# Patient Record
Sex: FEMALE | Race: WHITE | NOT HISPANIC OR LATINO | Employment: FULL TIME | URBAN - METROPOLITAN AREA
[De-identification: names, ages, dates, MRNs, and addresses within clinical notes are randomized per-mention and may not be internally consistent; named-entity substitution may affect disease eponyms.]

---

## 2017-03-15 ENCOUNTER — APPOINTMENT (OUTPATIENT)
Dept: GENERAL RADIOLOGY | Facility: HOSPITAL | Age: 47
End: 2017-03-15

## 2017-03-15 LAB
ALBUMIN SERPL-MCNC: 4.1 G/DL (ref 3.5–5.2)
ALBUMIN/GLOB SERPL: 1.1 G/DL
ALP SERPL-CCNC: 73 U/L (ref 39–117)
ALT SERPL W P-5'-P-CCNC: 14 U/L (ref 1–33)
ANION GAP SERPL CALCULATED.3IONS-SCNC: 11.7 MMOL/L
AST SERPL-CCNC: 16 U/L (ref 1–32)
BASOPHILS # BLD AUTO: 0.02 10*3/MM3 (ref 0–0.2)
BASOPHILS NFR BLD AUTO: 0.2 % (ref 0–1.5)
BILIRUB SERPL-MCNC: <0.2 MG/DL (ref 0.1–1.2)
BUN BLD-MCNC: 16 MG/DL (ref 6–20)
BUN/CREAT SERPL: 19.8 (ref 7–25)
CALCIUM SPEC-SCNC: 9.6 MG/DL (ref 8.6–10.5)
CHLORIDE SERPL-SCNC: 103 MMOL/L (ref 98–107)
CO2 SERPL-SCNC: 27.3 MMOL/L (ref 22–29)
CREAT BLD-MCNC: 0.81 MG/DL (ref 0.57–1)
DEPRECATED RDW RBC AUTO: 39.3 FL (ref 37–54)
EOSINOPHIL # BLD AUTO: 0.19 10*3/MM3 (ref 0–0.7)
EOSINOPHIL NFR BLD AUTO: 2.1 % (ref 0.3–6.2)
ERYTHROCYTE [DISTWIDTH] IN BLOOD BY AUTOMATED COUNT: 12.9 % (ref 11.7–13)
GFR SERPL CREATININE-BSD FRML MDRD: 75 ML/MIN/1.73
GLOBULIN UR ELPH-MCNC: 3.7 GM/DL
GLUCOSE BLD-MCNC: 117 MG/DL (ref 65–99)
HCT VFR BLD AUTO: 35.7 % (ref 35.6–45.5)
HGB BLD-MCNC: 11.7 G/DL (ref 11.9–15.5)
IMM GRANULOCYTES # BLD: 0 10*3/MM3 (ref 0–0.03)
IMM GRANULOCYTES NFR BLD: 0 % (ref 0–0.5)
LYMPHOCYTES # BLD AUTO: 3.57 10*3/MM3 (ref 0.9–4.8)
LYMPHOCYTES NFR BLD AUTO: 40.3 % (ref 19.6–45.3)
MCH RBC QN AUTO: 27.7 PG (ref 26.9–32)
MCHC RBC AUTO-ENTMCNC: 32.8 G/DL (ref 32.4–36.3)
MCV RBC AUTO: 84.4 FL (ref 80.5–98.2)
MONOCYTES # BLD AUTO: 0.46 10*3/MM3 (ref 0.2–1.2)
MONOCYTES NFR BLD AUTO: 5.2 % (ref 5–12)
NEUTROPHILS # BLD AUTO: 4.61 10*3/MM3 (ref 1.9–8.1)
NEUTROPHILS NFR BLD AUTO: 52.2 % (ref 42.7–76)
PLATELET # BLD AUTO: 310 10*3/MM3 (ref 140–500)
PMV BLD AUTO: 10.3 FL (ref 6–12)
POTASSIUM BLD-SCNC: 4.2 MMOL/L (ref 3.5–5.2)
PROT SERPL-MCNC: 7.8 G/DL (ref 6–8.5)
RBC # BLD AUTO: 4.23 10*6/MM3 (ref 3.9–5.2)
S PYO AG THROAT QL: NEGATIVE
SODIUM BLD-SCNC: 142 MMOL/L (ref 136–145)
WBC NRBC COR # BLD: 8.85 10*3/MM3 (ref 4.5–10.7)

## 2017-03-15 PROCEDURE — 99284 EMERGENCY DEPT VISIT MOD MDM: CPT

## 2017-03-15 PROCEDURE — 71020 HC CHEST PA AND LATERAL: CPT

## 2017-03-15 PROCEDURE — 85025 COMPLETE CBC W/AUTO DIFF WBC: CPT | Performed by: EMERGENCY MEDICINE

## 2017-03-15 PROCEDURE — 99283 EMERGENCY DEPT VISIT LOW MDM: CPT

## 2017-03-15 PROCEDURE — 80053 COMPREHEN METABOLIC PANEL: CPT | Performed by: EMERGENCY MEDICINE

## 2017-03-15 PROCEDURE — 87081 CULTURE SCREEN ONLY: CPT | Performed by: EMERGENCY MEDICINE

## 2017-03-15 PROCEDURE — 87880 STREP A ASSAY W/OPTIC: CPT | Performed by: EMERGENCY MEDICINE

## 2017-03-15 RX ORDER — ACETAMINOPHEN 325 MG/1
650 TABLET ORAL EVERY 6 HOURS PRN
COMMUNITY
End: 2022-04-11

## 2017-03-16 ENCOUNTER — HOSPITAL ENCOUNTER (EMERGENCY)
Facility: HOSPITAL | Age: 47
Discharge: HOME | End: 2017-03-16
Attending: EMERGENCY MEDICINE | Admitting: EMERGENCY MEDICINE

## 2017-03-16 VITALS
TEMPERATURE: 97.6 F | RESPIRATION RATE: 16 BRPM | HEIGHT: 63 IN | SYSTOLIC BLOOD PRESSURE: 123 MMHG | BODY MASS INDEX: 28.35 KG/M2 | HEART RATE: 80 BPM | DIASTOLIC BLOOD PRESSURE: 76 MMHG | OXYGEN SATURATION: 98 % | WEIGHT: 160 LBS

## 2017-03-16 DIAGNOSIS — J45.40 REACTIVE AIRWAY DISEASE, MODERATE PERSISTENT, UNCOMPLICATED: Primary | ICD-10-CM

## 2017-03-16 PROCEDURE — 94640 AIRWAY INHALATION TREATMENT: CPT

## 2017-03-16 RX ORDER — ALBUTEROL SULFATE 2.5 MG/3ML
2.5 SOLUTION RESPIRATORY (INHALATION) ONCE
Status: COMPLETED | OUTPATIENT
Start: 2017-03-16 | End: 2017-03-16

## 2017-03-16 RX ORDER — ALBUTEROL SULFATE 90 UG/1
2 AEROSOL, METERED RESPIRATORY (INHALATION) EVERY 4 HOURS PRN
Qty: 1 INHALER | Refills: 0 | Status: SHIPPED | OUTPATIENT
Start: 2017-03-16 | End: 2020-10-14

## 2017-03-16 RX ADMIN — ALBUTEROL SULFATE 2.5 MG: 2.5 SOLUTION RESPIRATORY (INHALATION) at 01:46

## 2017-03-16 NOTE — ED PROVIDER NOTES
History   Pt presents to the ED c/o a sore throat that has been present for the past month. Pt also reports a productive cough with yellow sputum, congestion, and SOA. She denies fever, chills, abd pain, N/V/D, or CP.     Exam  Pt is resting comfortably, in no distress, and without focal neurologic deficit  Cardiovascular exam is within normal limits and without murmur  Lungs are clear  Oropharynx is clear and w/o swelling.     Course  Strep is negative. Pt will be discharged.    Attestation:  I supervised care provided by the midlevel provider.    We have discussed this patient's history, physical exam, and treatment plan.    I have reviewed the note and personally saw and examined the patient and agree with the plan of care.  I agree with the midlevel provider's findings and plan. I reviewed the midlevel providers note.     Documentation assistance provided by mallory Chester for Dr Le Information recorded by the mallory was done at my direction and has been verified and validated by me.     Lis Chester  03/16/17 1378       Kenneth Le MD  03/16/17 4317

## 2017-03-16 NOTE — ED NOTES
Pt presents to ER with c/o sore throat that has been going on for a month.  Within the last 2 weeks, she c/o cough with green sputum.  Family member with her states she has been increasingly short of breath the past two weeks.     Leigh Ann Mohan RN  03/15/17 6268

## 2017-03-16 NOTE — ED PROVIDER NOTES
EMERGENCY DEPARTMENT ENCOUNTER    CHIEF COMPLAINT  Chief Complaint: Sore Throat  History given by: Patient with    History limited by:   Room Number: 03/03  PMD: No Known Provider      HPI:  Pt is a 46 y.o. female who presents complaining of sore throat that has been taking place for 1 month. Pt also reports having productive cough with yellow sputum. Pt has also had congestion and SOA. Pt denies any abd pain, vomiting, fever.     Duration: 1 month  Onset: gradual  Timing: waxing and waning  Quality: sore throat with productive cough  Intensity/Severity: moderate  Progression: worsening  Associated Symptoms: congestion, SOA  Aggravating Factors: none  Alleviating Factors: none  Previous Episodes: none  Treatment before arrival: none    PAST MEDICAL HISTORY  Active Ambulatory Problems     Diagnosis Date Noted   • No Active Ambulatory Problems     Resolved Ambulatory Problems     Diagnosis Date Noted   • No Resolved Ambulatory Problems     No Additional Past Medical History       PAST SURGICAL HISTORY  History reviewed. No pertinent past surgical history.    FAMILY HISTORY  History reviewed. No pertinent family history.    SOCIAL HISTORY  Social History     Social History   • Marital status:      Spouse name: N/A   • Number of children: N/A   • Years of education: N/A     Occupational History   • Not on file.     Social History Main Topics   • Smoking status: Never Smoker   • Smokeless tobacco: Not on file   • Alcohol use No   • Drug use: No   • Sexual activity: Defer     Other Topics Concern   • Not on file     Social History Narrative   • No narrative on file       ALLERGIES  Review of patient's allergies indicates no known allergies.    REVIEW OF SYSTEMS  Review of Systems   Constitutional: Negative for fever.   HENT: Positive for congestion and sore throat.    Eyes: Negative.    Respiratory: Positive for cough and shortness of breath.    Cardiovascular: Negative for chest pain.    Gastrointestinal: Negative for abdominal pain, diarrhea and vomiting.   Genitourinary: Negative for dysuria.   Musculoskeletal: Negative for neck pain.   Skin: Negative for rash.   Allergic/Immunologic: Negative.    Neurological: Negative for weakness, numbness and headaches.   Hematological: Negative.    Psychiatric/Behavioral: Negative.    All other systems reviewed and are negative.      PHYSICAL EXAM  ED Triage Vitals   Temp Heart Rate Resp BP SpO2   03/15/17 2125 03/15/17 2125 03/15/17 2125 03/15/17 2127 03/15/17 2125   98.7 °F (37.1 °C) 72 18 151/77 98 %      Temp src Heart Rate Source Patient Position BP Location FiO2 (%)   03/15/17 2125 03/15/17 2125 03/15/17 2127 03/15/17 2127 --   Tympanic Monitor Sitting Left arm        Physical Exam   Constitutional: She is oriented to person, place, and time and well-developed, well-nourished, and in no distress. No distress.   HENT:   Head: Normocephalic and atraumatic.   Mouth/Throat: Oropharynx is clear and moist.   Eyes: EOM are normal. Pupils are equal, round, and reactive to light.   Neck: Normal range of motion. Neck supple.   Cardiovascular: Normal rate, regular rhythm and normal heart sounds.    Pulmonary/Chest: Effort normal and breath sounds normal. No respiratory distress.   Abdominal: Soft. There is no tenderness. There is no rebound and no guarding.   Musculoskeletal: Normal range of motion. She exhibits no edema.   Neurological: She is alert and oriented to person, place, and time. She has normal sensation and normal strength.   Skin: Skin is warm and dry. No rash noted.   Psychiatric: Mood and affect normal.   Nursing note and vitals reviewed.      LAB RESULTS  Lab Results (last 24 hours)     Procedure Component Value Units Date/Time    CBC & Differential [[id number]] Collected:  03/15/17 2207    Specimen:  Blood Updated:  03/15/17 2230    Narrative:       The following orders were created for panel order CBC & Differential.  Procedure                                Abnormality         Status                     ---------                               -----------         ------                     CBC Auto Differential[[id number]]         Abnormal            Final result                 Please view results for these tests on the individual orders.    Comprehensive Metabolic Panel [[id number]]  (Abnormal) Collected:  03/15/17 2207    Specimen:  Blood Updated:  03/15/17 2253     Glucose 117 (H) mg/dL      BUN 16 mg/dL      Creatinine 0.81 mg/dL      Sodium 142 mmol/L      Potassium 4.2 mmol/L      Chloride 103 mmol/L      CO2 27.3 mmol/L      Calcium 9.6 mg/dL      Total Protein 7.8 g/dL      Albumin 4.10 g/dL      ALT (SGPT) 14 U/L      AST (SGOT) 16 U/L      Alkaline Phosphatase 73 U/L      Total Bilirubin <0.2 mg/dL      eGFR Non African Amer 75 mL/min/1.73      Globulin 3.7 gm/dL      A/G Ratio 1.1 g/dL      BUN/Creatinine Ratio 19.8      Anion Gap 11.7 mmol/L     CBC Auto Differential [[id number]]  (Abnormal) Collected:  03/15/17 2207    Specimen:  Blood Updated:  03/15/17 2230     WBC 8.85 10*3/mm3      RBC 4.23 10*6/mm3      Hemoglobin 11.7 (L) g/dL      Hematocrit 35.7 %      MCV 84.4 fL      MCH 27.7 pg      MCHC 32.8 g/dL      RDW 12.9 %      RDW-SD 39.3 fl      MPV 10.3 fL      Platelets 310 10*3/mm3      Neutrophil % 52.2 %      Lymphocyte % 40.3 %      Monocyte % 5.2 %      Eosinophil % 2.1 %      Basophil % 0.2 %      Immature Grans % 0.0 %      Neutrophils, Absolute 4.61 10*3/mm3      Lymphocytes, Absolute 3.57 10*3/mm3      Monocytes, Absolute 0.46 10*3/mm3      Eosinophils, Absolute 0.19 10*3/mm3      Basophils, Absolute 0.02 10*3/mm3      Immature Grans, Absolute 0.00 10*3/mm3     Rapid Strep A Screen [[id number]]  (Normal) Collected:  03/15/17 2208    Specimen:  Swab from Throat Updated:  03/15/17 2244     Strep A Ag Negative     Beta Strep Culture, Throat [[id number]] Collected:  03/15/17 2208    Specimen:  Swab from Throat Updated:  03/15/17 2243          I  ordered the above labs and reviewed the results    RADIOLOGY  XR Chest 2 View   Final Result   Final result by Thomas Mitchell MD (03/15/17 22:55:05)     Narrative:     TWO-VIEW CHEST     HISTORY: Persistent cough and congestion.     The lungs are well-expanded and clear and the heart size is normal. No  acute abnormality is seen.     This report was finalized on 3/15/2017 10:55 PM by Dr. Joni Mitchell MD.             I ordered the above noted radiological studies. Interpreted by radiologist. Reviewed by me in PACS.       PROCEDURES  Procedures      PROGRESS AND CONSULTS  ED Course   12:20 AM  Ordered albuterol breathing tx.  2:19 AM  Rechecked with pt. She is improved after breathing treatment.  Informed pt of her labs and imaging showing nothing remarkable. Discussed with pt about plan to discharge. Pt understands and agrees with plan. All concerns addressed. Discussed pt with Dr. Le. Dr. Le has seen and evaluated pt and agrees with tx plan.       MEDICAL DECISION MAKING      MDM  Number of Diagnoses or Management Options  Reactive airway disease, moderate persistent, uncomplicated:   Diagnosis management comments: No evidence of cardiac involvement.           DIAGNOSIS  Final diagnoses:   Reactive airway disease, moderate persistent, uncomplicated       DISPOSITION  DISCHARGE    Patient discharged in stable condition.    Reviewed implications of results, diagnosis, meds, responsibility to follow up, warning signs and symptoms of possible worsening, potential complications and reasons to return to ER.    Patient/Family voiced understanding of above instructions.    Discussed plan for discharge, as there is no emergent indication for admission.  Pt/family is agreeable and understands need for follow up and repeat testing.  Pt is aware that discharge does not mean that nothing is wrong but it indicates no emergency is present that requires admission and they must continue care with follow-up as given  below or physician of their choice.     FOLLOW-UP  [facility] PHYSICAN REFERRAL SERVICE  [facility] [address] [phone]  Schedule an appointment as soon as possible for a visit           Medication List      New Prescriptions          albuterol 108 (90 BASE) MCG/ACT inhaler   Commonly known as:  PROVENTIL HFA;VENTOLIN HFA   Inhale 2 puffs Every 4 (Four) Hours As Needed for Wheezing.       fluticasone-salmeterol 100-50 MCG/DOSE DISKUS   Commonly known as:  ADVAIR DISKUS   Inhale 1 puff 2 (Two) Times a Day.               Latest Documented Vital Signs:  As of 2:49 AM  BP- 120/65 HR- 79 Temp- 98.7 °F (37.1 °C) (Tympanic) O2 sat- 92%    --  Documentation assistance provided by mallory Robison for Crow Saenz PA-C.  Information recorded by the scribe was done at my direction and has been verified and validated by me.     Franklin Robison  03/16/17 0249       SILVIA Ignacio  03/16/17 0330

## 2017-03-18 LAB — BACTERIA SPEC AEROBE CULT: NORMAL

## 2018-07-03 ENCOUNTER — HOSPITAL ENCOUNTER (OUTPATIENT)
Dept: GENERAL RADIOLOGY | Facility: HOSPITAL | Age: 48
Discharge: HOME | End: 2018-07-03
Attending: SPECIALIST | Admitting: SPECIALIST

## 2018-07-03 ENCOUNTER — HOSPITAL ENCOUNTER (OUTPATIENT)
Dept: CARDIOLOGY | Facility: HOSPITAL | Age: 48
Discharge: HOME | End: 2018-07-03
Attending: SPECIALIST

## 2018-07-03 ENCOUNTER — TRANSCRIBE ORDERS (OUTPATIENT)
Dept: ADMINISTRATIVE | Facility: HOSPITAL | Age: 48
End: 2018-07-03

## 2018-07-03 DIAGNOSIS — I10 ESSENTIAL HYPERTENSION, MALIGNANT: ICD-10-CM

## 2018-07-03 DIAGNOSIS — R00.2 PALPITATIONS: ICD-10-CM

## 2018-07-03 DIAGNOSIS — I10 ESSENTIAL HYPERTENSION, MALIGNANT: Primary | ICD-10-CM

## 2018-07-03 PROCEDURE — 93005 ELECTROCARDIOGRAM TRACING: CPT | Performed by: SPECIALIST

## 2018-07-03 PROCEDURE — 71046 X-RAY EXAM CHEST 2 VIEWS: CPT

## 2018-07-03 PROCEDURE — 93010 ELECTROCARDIOGRAM REPORT: CPT | Performed by: INTERNAL MEDICINE

## 2020-06-30 ENCOUNTER — APPOINTMENT (OUTPATIENT)
Dept: GENERAL RADIOLOGY | Facility: HOSPITAL | Age: 50
End: 2020-06-30

## 2020-06-30 ENCOUNTER — HOSPITAL ENCOUNTER (EMERGENCY)
Facility: HOSPITAL | Age: 50
Discharge: HOME | End: 2020-06-30
Attending: EMERGENCY MEDICINE | Admitting: EMERGENCY MEDICINE

## 2020-06-30 VITALS
OXYGEN SATURATION: 96 % | HEART RATE: 89 BPM | DIASTOLIC BLOOD PRESSURE: 87 MMHG | RESPIRATION RATE: 16 BRPM | SYSTOLIC BLOOD PRESSURE: 128 MMHG | TEMPERATURE: 99.1 F

## 2020-06-30 DIAGNOSIS — R43.0 ANOSMIA: ICD-10-CM

## 2020-06-30 DIAGNOSIS — Z20.822 SUSPECTED COVID-19 VIRUS INFECTION: ICD-10-CM

## 2020-06-30 DIAGNOSIS — R05.9 COUGH: Primary | ICD-10-CM

## 2020-06-30 LAB
ALBUMIN SERPL-MCNC: 4.3 G/DL (ref 3.5–5.2)
ALBUMIN/GLOB SERPL: 1 G/DL
ALP SERPL-CCNC: 64 U/L (ref 39–117)
ALT SERPL W P-5'-P-CCNC: 49 U/L (ref 1–33)
ANION GAP SERPL CALCULATED.3IONS-SCNC: 11.2 MMOL/L (ref 5–15)
AST SERPL-CCNC: 42 U/L (ref 1–32)
BASOPHILS # BLD AUTO: 0.01 10*3/MM3 (ref 0–0.2)
BASOPHILS NFR BLD AUTO: 0.2 % (ref 0–1.5)
BILIRUB SERPL-MCNC: 0.3 MG/DL (ref 0.2–1.2)
BUN SERPL-MCNC: 11 MG/DL (ref 6–20)
BUN/CREAT SERPL: 18 (ref 7–25)
CALCIUM SPEC-SCNC: 8.9 MG/DL (ref 8.6–10.5)
CHLORIDE SERPL-SCNC: 100 MMOL/L (ref 98–107)
CO2 SERPL-SCNC: 26.8 MMOL/L (ref 22–29)
CREAT SERPL-MCNC: 0.61 MG/DL (ref 0.57–1)
DEPRECATED RDW RBC AUTO: 41.5 FL (ref 37–54)
EOSINOPHIL # BLD AUTO: 0.01 10*3/MM3 (ref 0–0.4)
EOSINOPHIL NFR BLD AUTO: 0.2 % (ref 0.3–6.2)
ERYTHROCYTE [DISTWIDTH] IN BLOOD BY AUTOMATED COUNT: 13.2 % (ref 12.3–15.4)
GFR SERPL CREATININE-BSD FRML MDRD: 103 ML/MIN/1.73
GLOBULIN UR ELPH-MCNC: 4.1 GM/DL
GLUCOSE SERPL-MCNC: 109 MG/DL (ref 65–99)
HCT VFR BLD AUTO: 38.8 % (ref 34–46.6)
HGB BLD-MCNC: 12.7 G/DL (ref 12–15.9)
IMM GRANULOCYTES # BLD AUTO: 0.01 10*3/MM3 (ref 0–0.05)
IMM GRANULOCYTES NFR BLD AUTO: 0.2 % (ref 0–0.5)
LYMPHOCYTES # BLD AUTO: 1.21 10*3/MM3 (ref 0.7–3.1)
LYMPHOCYTES NFR BLD AUTO: 30 % (ref 19.6–45.3)
MCH RBC QN AUTO: 27.9 PG (ref 26.6–33)
MCHC RBC AUTO-ENTMCNC: 32.7 G/DL (ref 31.5–35.7)
MCV RBC AUTO: 85.3 FL (ref 79–97)
MONOCYTES # BLD AUTO: 0.36 10*3/MM3 (ref 0.1–0.9)
MONOCYTES NFR BLD AUTO: 8.9 % (ref 5–12)
NEUTROPHILS NFR BLD AUTO: 2.44 10*3/MM3 (ref 1.7–7)
NEUTROPHILS NFR BLD AUTO: 60.5 % (ref 42.7–76)
NRBC BLD AUTO-RTO: 0 /100 WBC (ref 0–0.2)
PLATELET # BLD AUTO: 221 10*3/MM3 (ref 140–450)
PMV BLD AUTO: 10.1 FL (ref 6–12)
POTASSIUM SERPL-SCNC: 4.8 MMOL/L (ref 3.5–5.2)
PROT SERPL-MCNC: 8.4 G/DL (ref 6–8.5)
RBC # BLD AUTO: 4.55 10*6/MM3 (ref 3.77–5.28)
SARS-COV-2 N GENE NPH QL NAA+PROBE: DETECTED
SODIUM SERPL-SCNC: 138 MMOL/L (ref 136–145)
WBC # BLD AUTO: 4.04 10*3/MM3 (ref 3.4–10.8)

## 2020-06-30 PROCEDURE — 85025 COMPLETE CBC W/AUTO DIFF WBC: CPT | Performed by: EMERGENCY MEDICINE

## 2020-06-30 PROCEDURE — 99284 EMERGENCY DEPT VISIT MOD MDM: CPT

## 2020-06-30 PROCEDURE — 87635 SARS-COV-2 COVID-19 AMP PRB: CPT | Performed by: EMERGENCY MEDICINE

## 2020-06-30 PROCEDURE — 80053 COMPREHEN METABOLIC PANEL: CPT | Performed by: EMERGENCY MEDICINE

## 2020-06-30 PROCEDURE — 71045 X-RAY EXAM CHEST 1 VIEW: CPT

## 2020-06-30 RX ORDER — SODIUM CHLORIDE 0.9 % (FLUSH) 0.9 %
10 SYRINGE (ML) INJECTION AS NEEDED
Status: DISCONTINUED | OUTPATIENT
Start: 2020-06-30 | End: 2020-06-30 | Stop reason: HOSPADM

## 2020-07-01 ENCOUNTER — EPISODE CHANGES (OUTPATIENT)
Dept: CASE MANAGEMENT | Facility: OTHER | Age: 50
End: 2020-07-01

## 2020-07-17 ENCOUNTER — EPISODE CHANGES (OUTPATIENT)
Dept: CASE MANAGEMENT | Facility: OTHER | Age: 50
End: 2020-07-17

## 2020-10-14 ENCOUNTER — OFFICE VISIT (OUTPATIENT)
Dept: FAMILY MEDICINE CLINIC | Facility: CLINIC | Age: 50
End: 2020-10-14

## 2020-10-14 VITALS
OXYGEN SATURATION: 98 % | WEIGHT: 175 LBS | HEART RATE: 74 BPM | BODY MASS INDEX: 31.01 KG/M2 | TEMPERATURE: 96.9 F | SYSTOLIC BLOOD PRESSURE: 140 MMHG | DIASTOLIC BLOOD PRESSURE: 82 MMHG | HEIGHT: 63 IN

## 2020-10-14 DIAGNOSIS — R73.9 HYPERGLYCEMIA: ICD-10-CM

## 2020-10-14 DIAGNOSIS — I10 ESSENTIAL (PRIMARY) HYPERTENSION: Primary | ICD-10-CM

## 2020-10-14 DIAGNOSIS — K21.9 GASTROESOPHAGEAL REFLUX DISEASE WITHOUT ESOPHAGITIS: ICD-10-CM

## 2020-10-14 DIAGNOSIS — J02.9 SORE THROAT: ICD-10-CM

## 2020-10-14 DIAGNOSIS — R51.9 OCCIPITAL HEADACHE: ICD-10-CM

## 2020-10-14 DIAGNOSIS — M72.0 DUPUYTREN'S CONTRACTURE OF BOTH HANDS: ICD-10-CM

## 2020-10-14 DIAGNOSIS — R74.01 ELEVATED TRANSAMINASE LEVEL: ICD-10-CM

## 2020-10-14 PROBLEM — M19.90 ARTHRITIS: Status: ACTIVE | Noted: 2020-10-14

## 2020-10-14 PROBLEM — E11.65 TYPE 2 DIABETES MELLITUS WITH HYPERGLYCEMIA: Status: ACTIVE | Noted: 2020-10-14

## 2020-10-14 PROCEDURE — 99203 OFFICE O/P NEW LOW 30 MIN: CPT | Performed by: INTERNAL MEDICINE

## 2020-10-14 RX ORDER — OMEPRAZOLE 40 MG/1
40 CAPSULE, DELAYED RELEASE ORAL DAILY
Qty: 30 CAPSULE | Refills: 5 | Status: SHIPPED | OUTPATIENT
Start: 2020-10-14 | End: 2021-07-21

## 2020-10-14 RX ORDER — METOPROLOL SUCCINATE 25 MG/1
25 TABLET, EXTENDED RELEASE ORAL DAILY
Qty: 30 TABLET | Refills: 5 | Status: SHIPPED | OUTPATIENT
Start: 2020-10-14 | End: 2021-12-16 | Stop reason: SDUPTHER

## 2020-10-14 NOTE — PROGRESS NOTES
Subjective Chief complaint is headache and elevated blood pressure  Rosi Singleton is a 50 y.o. female.     History of Present Illness Rosi is here today to establish care.  She was a previous patient of Dr. Soto.  She is not on any medications.  She has been having some headaches.  These are generally in the morning when she awakens.  They are occipital in nature.  She does take Tylenol and that seems to help and by the end of the day they seem to be gone.  She does take her blood pressure x1 this is happening and her blood pressure is elevated.  She also is complaining of a sore throat.  She has had frequent problems with this.  She did have a positive COVID-19 test in June.  She was quarantined for 14 days.  She has no other respiratory symptoms.  She does get some acid reflux problems.  She does report having had a endoscopy done approximately 10 years ago but she cannot recall what that showed.  I did review lab work from the [facility] visit.  She did have an elevated blood sugar.  She has no known diabetes.  Her transaminases were also mildly elevated.  She has not had a cholecystectomy.    The following portions of the patient's history were reviewed and updated as appropriate: allergies, current medications, past family history, past medical history, past social history, past surgical history and problem list.    Review of Systems   Constitutional: Negative for chills and fever.   Respiratory: Negative for cough, chest tightness and shortness of breath.    Cardiovascular: Negative for chest pain and leg swelling.   Gastrointestinal: Positive for vomiting and indigestion. Negative for blood in stool.   Neurological: Positive for headache.       Objective   Physical Exam  Constitutional:       Appearance: She is well-developed.   HENT:      Ears:      Comments: Oropharynx is clear and the palate elevates symmetrically.  Neck:      Musculoskeletal: No edema.      Thyroid: No  thyromegaly.      Vascular: No carotid bruit.   Cardiovascular:      Rate and Rhythm: Normal rate and regular rhythm.      Heart sounds: Normal heart sounds. No murmur. No friction rub.   Pulmonary:      Effort: Pulmonary effort is normal. No respiratory distress.      Breath sounds: Normal breath sounds. No wheezing.   Abdominal:      General: Bowel sounds are normal. There is no distension.      Palpations: Abdomen is soft. There is no mass.      Tenderness: There is no abdominal tenderness. There is no guarding.   Musculoskeletal:      Comments: She does have bilateral Dupuytren's contractures of the fourth digits.   Lymphadenopathy:      Cervical: No cervical adenopathy.           Assessment/Plan   Diagnoses and all orders for this visit:    1. Essential (primary) hypertension (Primary)  -     Comprehensive Metabolic Panel  -     Lipid Panel    2. Occipital headache    3. Sore throat    4. Gastroesophageal reflux disease without esophagitis    5. Hyperglycemia  -     Hemoglobin A1c  -     Lipid Panel    6. Dupuytren's contracture of both hands  -     Ambulatory Referral to Hand Surgery    7. Elevated transaminase level  -     Comprehensive Metabolic Panel    Other orders  -     metoprolol succinate XL (Toprol XL) 25 MG 24 hr tablet; Take 1 tablet by mouth Daily.  Dispense: 30 tablet; Refill: 5  -     omeprazole (PrilOSEC) 40 MG capsule; Take 1 capsule by mouth Daily.  Dispense: 30 capsule; Refill: 5      Rosi is here today for establishment of care.  Her blood pressure my exam is 130/100.  I do think she is going to need something for blood pressure and this may help some with her headaches.  I am going to prescribe very low-dose metoprolol.  For her sore throat I am going to treat her for reflux.  She may be getting some nocturnal acid.  I am also going to follow-up on her hyperglycemia and elevated transaminases.  I will see her back in 3 to 4 weeks.

## 2020-10-15 LAB
ALBUMIN SERPL-MCNC: 4.4 G/DL (ref 3.5–5.2)
ALBUMIN/GLOB SERPL: 1.7 G/DL
ALP SERPL-CCNC: 92 U/L (ref 39–117)
ALT SERPL-CCNC: 23 U/L (ref 1–33)
AST SERPL-CCNC: 17 U/L (ref 1–32)
BILIRUB SERPL-MCNC: 0.2 MG/DL (ref 0–1.2)
BUN SERPL-MCNC: 13 MG/DL (ref 6–20)
BUN/CREAT SERPL: 20 (ref 7–25)
CALCIUM SERPL-MCNC: 9.2 MG/DL (ref 8.6–10.5)
CHLORIDE SERPL-SCNC: 104 MMOL/L (ref 98–107)
CHOLEST SERPL-MCNC: 195 MG/DL (ref 0–200)
CO2 SERPL-SCNC: 25 MMOL/L (ref 22–29)
CREAT SERPL-MCNC: 0.65 MG/DL (ref 0.57–1)
GLOBULIN SER CALC-MCNC: 2.6 GM/DL
GLUCOSE SERPL-MCNC: 110 MG/DL (ref 65–99)
HBA1C MFR BLD: 6.3 % (ref 4.8–5.6)
HDLC SERPL-MCNC: 56 MG/DL (ref 40–60)
LDLC SERPL CALC-MCNC: 113 MG/DL (ref 0–100)
POTASSIUM SERPL-SCNC: 4.9 MMOL/L (ref 3.5–5.2)
PROT SERPL-MCNC: 7 G/DL (ref 6–8.5)
SODIUM SERPL-SCNC: 141 MMOL/L (ref 136–145)
TRIGL SERPL-MCNC: 149 MG/DL (ref 0–150)
VLDLC SERPL CALC-MCNC: 26 MG/DL (ref 5–40)

## 2020-12-17 ENCOUNTER — OFFICE VISIT (OUTPATIENT)
Dept: FAMILY MEDICINE CLINIC | Facility: CLINIC | Age: 50
End: 2020-12-17

## 2020-12-17 VITALS
TEMPERATURE: 96.7 F | BODY MASS INDEX: 31.43 KG/M2 | SYSTOLIC BLOOD PRESSURE: 120 MMHG | OXYGEN SATURATION: 98 % | DIASTOLIC BLOOD PRESSURE: 80 MMHG | HEIGHT: 63 IN | WEIGHT: 177.4 LBS | HEART RATE: 75 BPM

## 2020-12-17 DIAGNOSIS — I10 ESSENTIAL (PRIMARY) HYPERTENSION: ICD-10-CM

## 2020-12-17 DIAGNOSIS — R51.9 OCCIPITAL HEADACHE: ICD-10-CM

## 2020-12-17 DIAGNOSIS — M19.041 PRIMARY OSTEOARTHRITIS OF BOTH HANDS: Primary | ICD-10-CM

## 2020-12-17 DIAGNOSIS — M19.042 PRIMARY OSTEOARTHRITIS OF BOTH HANDS: Primary | ICD-10-CM

## 2020-12-17 PROBLEM — R73.02 IMPAIRED GLUCOSE TOLERANCE: Status: ACTIVE | Noted: 2020-12-17

## 2020-12-17 PROBLEM — J02.9 SORE THROAT: Status: RESOLVED | Noted: 2020-10-14 | Resolved: 2020-12-17

## 2020-12-17 PROCEDURE — 99214 OFFICE O/P EST MOD 30 MIN: CPT | Performed by: INTERNAL MEDICINE

## 2020-12-17 RX ORDER — MELOXICAM 15 MG/1
15 TABLET ORAL DAILY
Qty: 15 TABLET | Refills: 0 | Status: SHIPPED | OUTPATIENT
Start: 2020-12-17 | End: 2020-12-29

## 2020-12-17 NOTE — PROGRESS NOTES
Subjective Chief complaint is pain in the hands  Rosi Singleton is a 50 y.o. female.     History of Present Illness   Rosi is here today for some pain in the hands.  She is experiencing pain mostly in the right hand.  It is in the PIP joints.  She is having trouble making a fist.  She has had to take off several days about 5 from work because of this.  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  Since her last visit her sore throat is gotten better with the treatment of the reflux.  Her blood pressure certainly looks better and her heart rate is better but her headaches persist.  These remain his occipital headaches.  She is still seeming to awaken with them.  The following portions of the patient's history were reviewed and updated as appropriate: allergies, current medications, past family history, past medical history, past social history, past surgical history and problem list.    Review of Systems   Constitutional: Negative for chills and fever.   Respiratory: Negative for cough.    Musculoskeletal: Positive for arthralgias, joint swelling and neck pain.   Neurological: Positive for headache.       Objective   Physical Exam  Vitals signs and nursing note reviewed.   Cardiovascular:      Rate and Rhythm: Normal rate and regular rhythm.      Pulses: Normal pulses.   Pulmonary:      Effort: Pulmonary effort is normal.      Breath sounds: No wheezing or rales.   Abdominal:      General: Bowel sounds are normal.      Palpations: Abdomen is soft.      Tenderness: There is no abdominal tenderness. There is no guarding or rebound.   Musculoskeletal:      Comments: She has some osteoarthritic deformities at the DIP joints of the right hand.  However these are not tender with flexion.  Her PIP joints are tender and unable to fully flex on the right.  They do a little bit better on the left.   Neurological:      Mental Status: She is alert.           Assessment/Plan   Diagnoses and all orders for this  visit:    1. Primary osteoarthritis of both hands (Primary)    2. Essential (primary) hypertension    3. Occipital headache    Other orders  -     meloxicam (MOBIC) 15 MG tablet; Take 1 tablet by mouth Daily.  Dispense: 15 tablet; Refill: 0    Gulzhamal is here today for hand pain.  We are going to let her try few weeks of meloxicam and see if that will make things better.  If it does not then we may consider some x-rays of her hands and some testing for rheumatic diseases.  The meloxicam may also help her occipital headache if it is coming from her neck.  If not then we may consider a CAT scan of the head.

## 2020-12-23 ENCOUNTER — TELEPHONE (OUTPATIENT)
Dept: FAMILY MEDICINE CLINIC | Facility: CLINIC | Age: 50
End: 2020-12-23

## 2020-12-29 RX ORDER — MELOXICAM 15 MG/1
15 TABLET ORAL DAILY
Qty: 15 TABLET | Refills: 0 | Status: SHIPPED | OUTPATIENT
Start: 2020-12-29 | End: 2021-01-19

## 2021-01-11 ENCOUNTER — OFFICE VISIT (OUTPATIENT)
Dept: FAMILY MEDICINE CLINIC | Facility: CLINIC | Age: 51
End: 2021-01-11

## 2021-01-11 ENCOUNTER — HOSPITAL ENCOUNTER (OUTPATIENT)
Dept: GENERAL RADIOLOGY | Facility: HOSPITAL | Age: 51
Discharge: HOME | End: 2021-01-11
Admitting: INTERNAL MEDICINE

## 2021-01-11 VITALS
BODY MASS INDEX: 31.47 KG/M2 | HEIGHT: 63 IN | TEMPERATURE: 95.8 F | WEIGHT: 177.6 LBS | SYSTOLIC BLOOD PRESSURE: 110 MMHG | RESPIRATION RATE: 16 BRPM | DIASTOLIC BLOOD PRESSURE: 88 MMHG | OXYGEN SATURATION: 98 % | HEART RATE: 70 BPM

## 2021-01-11 DIAGNOSIS — R20.2 NUMBNESS AND TINGLING IN RIGHT HAND: ICD-10-CM

## 2021-01-11 DIAGNOSIS — M79.641 PAIN IN BOTH HANDS: ICD-10-CM

## 2021-01-11 DIAGNOSIS — R20.0 NUMBNESS AND TINGLING IN RIGHT HAND: ICD-10-CM

## 2021-01-11 DIAGNOSIS — M79.641 PAIN IN BOTH HANDS: Primary | ICD-10-CM

## 2021-01-11 DIAGNOSIS — M79.642 PAIN IN BOTH HANDS: Primary | ICD-10-CM

## 2021-01-11 DIAGNOSIS — M79.642 PAIN IN BOTH HANDS: ICD-10-CM

## 2021-01-11 PROCEDURE — 99214 OFFICE O/P EST MOD 30 MIN: CPT | Performed by: INTERNAL MEDICINE

## 2021-01-11 PROCEDURE — 73130 X-RAY EXAM OF HAND: CPT

## 2021-01-11 RX ORDER — METHYLPREDNISOLONE 4 MG/1
TABLET ORAL
Qty: 21 TABLET | Refills: 0 | Status: SHIPPED | OUTPATIENT
Start: 2021-01-11 | End: 2021-06-14

## 2021-01-11 NOTE — PROGRESS NOTES
Subjective Chief complaint is continuing hand pain  Rosi Singleton is a 50 y.o. female.     History of Present Illness Rosi is here today for continuing hand pain.  The meloxicam did not seem to help.  The pain is still in her fingers primarily on the right hand.  She is now having some associated numbness in her fingers when she tries to use a fork or lift a cup.  At her last visit we did give her the meloxicam and it did not seem to help.  I was mostly convinced that she had some osteoarthritis in her DIP joints.  However she did have some tenderness in her PIP joints at that time.    The following portions of the patient's history were reviewed and updated as appropriate: allergies, current medications, past family history, past medical history, past social history, past surgical history and problem list.    Review of Systems   Constitutional: Negative for chills and fever.   Respiratory: Negative for cough.    Musculoskeletal: Positive for joint swelling.   Neurological: Positive for numbness.       Objective   Physical Exam  Vitals signs and nursing note reviewed.   Cardiovascular:      Rate and Rhythm: Normal rate.   Pulmonary:      Effort: Pulmonary effort is normal.   Musculoskeletal:      Comments: There is no change in the   Neurological:      Mental Status: She is alert.      Comments: Tinel's is negative.           Assessment/Plan   Diagnoses and all orders for this visit:    1. Pain in both hands (Primary)  -     Rheumatoid Factor  -     C-reactive Protein  -     Cyclic Citrul Peptide Antibody, IgG / IgA  -     Uric Acid  -     Sedimentation Rate  -     XR hand 3+ vw bilateral; Future  -     Nerve Conduction Test; Future    2. Numbness and tingling in right hand  -     Rheumatoid Factor  -     C-reactive Protein  -     Cyclic Citrul Peptide Antibody, IgG / IgA  -     Uric Acid  -     Sedimentation Rate  -     XR hand 3+ vw bilateral; Future  -     Nerve Conduction Test; Future    Other  orders  -     methylPREDNISolone (MEDROL) 4 MG dose pack; Take as directed on package instructions.  Dispense: 21 tablet; Refill: 0    Rosi is here today for continuing hand pain.  She did not receive any benefit from the meloxicam.  I am going to get some x-rays and check some lab work.  We are also going to do some nerve conduction test because of the numbness.  I am going to give her a Medrol dose pack and see if that will help the pain.

## 2021-01-13 ENCOUNTER — TELEPHONE (OUTPATIENT)
Dept: FAMILY MEDICINE CLINIC | Facility: CLINIC | Age: 51
End: 2021-01-13

## 2021-01-13 LAB
CCP IGA+IGG SERPL IA-ACNC: 11 UNITS (ref 0–19)
CRP SERPL-MCNC: 1.56 MG/DL (ref 0–0.5)
ERYTHROCYTE [SEDIMENTATION RATE] IN BLOOD BY WESTERGREN METHOD: 34 MM/HR (ref 0–30)
RHEUMATOID FACT SERPL-ACNC: <10 IU/ML (ref 0–13.9)
URATE SERPL-MCNC: 5.3 MG/DL (ref 2.4–5.7)

## 2021-01-13 NOTE — TELEPHONE ENCOUNTER
Caller: DOUG     Relationship: Child    Best call back number: [phone]    What test was performed: XRAY ON HAND     When was the test performed: 01/11    Where was the test performed: [facility]

## 2021-01-19 RX ORDER — MELOXICAM 15 MG/1
15 TABLET ORAL DAILY
Qty: 15 TABLET | Refills: 0 | Status: SHIPPED | OUTPATIENT
Start: 2021-01-19 | End: 2021-06-14

## 2021-01-27 DIAGNOSIS — M79.642 PAIN IN BOTH HANDS: Primary | ICD-10-CM

## 2021-01-27 DIAGNOSIS — M79.641 PAIN IN BOTH HANDS: Primary | ICD-10-CM

## 2021-03-31 ENCOUNTER — HOSPITAL ENCOUNTER (OUTPATIENT)
Dept: INFUSION THERAPY | Facility: HOSPITAL | Age: 51
Discharge: HOME | End: 2021-03-31
Admitting: PHYSICAL MEDICINE & REHABILITATION

## 2021-03-31 DIAGNOSIS — M79.641 PAIN IN BOTH HANDS: ICD-10-CM

## 2021-03-31 DIAGNOSIS — M79.642 PAIN IN BOTH HANDS: ICD-10-CM

## 2021-03-31 PROCEDURE — 95886 MUSC TEST DONE W/N TEST COMP: CPT

## 2021-03-31 PROCEDURE — 95909 NRV CNDJ TST 5-6 STUDIES: CPT

## 2021-04-07 DIAGNOSIS — G56.03 BILATERAL CARPAL TUNNEL SYNDROME: Primary | ICD-10-CM

## 2021-04-08 ENCOUNTER — TELEPHONE (OUTPATIENT)
Dept: FAMILY MEDICINE CLINIC | Facility: CLINIC | Age: 51
End: 2021-04-08

## 2021-04-08 NOTE — TELEPHONE ENCOUNTER
Nothing    Caller: Rosi Singleton    Relationship: Self    Best call back number: [phone] (M)      What test was performed: NERVE CONDUCTION TEST    When was the test performed: 03/31/21    Where was the test performed: [facility]    Additional notes:     I attempted to call the above listed number.  There was no answer and nothing allowed me to leave a message.

## 2021-04-09 ENCOUNTER — TELEPHONE (OUTPATIENT)
Dept: FAMILY MEDICINE CLINIC | Facility: CLINIC | Age: 51
End: 2021-04-09

## 2021-04-09 NOTE — TELEPHONE ENCOUNTER
PT'S DAUGHTER IS CALLING BACK FOR TEST RESULTS.  PLEASE CALL [phone] NADIR    Results were given to the patient's daughter.  I did advise I was referring to hand surgeon.  They may be able to recommend splints or an injection.

## 2021-04-16 ENCOUNTER — TELEPHONE (OUTPATIENT)
Dept: FAMILY MEDICINE CLINIC | Facility: CLINIC | Age: 51
End: 2021-04-16

## 2021-04-16 NOTE — TELEPHONE ENCOUNTER
Caller: DOUG    Relationship: PATIENTS DAUGHTER IN LAW    Best call back number:     What is the best time to reach you: ANYTIME IS OK    Who are you requesting to speak with (clinical staff, provider,  specific staff member):    Do you know the name of the person who called:     What was the call regarding: DOUG IS CALLING IN STATING THAT SOMEONE WAS SUPPOSED TO CALL HER IN REGARDS TO THE PATIENT SEEING A HAND SPECIALIST AND NO ONE HAS CONTACTED HER YET.      Do you require a callback: YES

## 2021-06-14 ENCOUNTER — OFFICE VISIT (OUTPATIENT)
Dept: FAMILY MEDICINE CLINIC | Facility: CLINIC | Age: 51
End: 2021-06-14

## 2021-06-14 VITALS
BODY MASS INDEX: 31.36 KG/M2 | DIASTOLIC BLOOD PRESSURE: 69 MMHG | WEIGHT: 177 LBS | SYSTOLIC BLOOD PRESSURE: 132 MMHG | HEIGHT: 63 IN | OXYGEN SATURATION: 98 % | HEART RATE: 59 BPM | TEMPERATURE: 97.8 F | RESPIRATION RATE: 16 BRPM

## 2021-06-14 DIAGNOSIS — R41.3 MEMORY DIFFICULTY: Primary | ICD-10-CM

## 2021-06-14 PROCEDURE — 99213 OFFICE O/P EST LOW 20 MIN: CPT | Performed by: INTERNAL MEDICINE

## 2021-06-14 NOTE — PROGRESS NOTES
Subjective Complaint is memory issues  Rosi Singleton is a 50 y.o. female.     History of Present Illness Rosi is here today for memory issues.  Her daughter is her .  She has been in the country for 15 years.  She does have a green card.  She has been trying to get citizenship but cannot seem to member things as well as she used to.  They are wondering if there is anything that can be done.  She has a little bit young to be having any dementia issues.  I would wonder whether this is just a learning disability that is complicated by having to understand English.    The following portions of the patient's history were reviewed and updated as appropriate: allergies, current medications, past family history, past medical history, past social history, past surgical history and problem list.    Review of Systems   Neurological:        Her headaches seem to get better after injections for carpal tunnel syndrome.  I would wonder whether the steroids may have helped that.       Objective   Physical Exam  Constitutional:       Appearance: Normal appearance.   Cardiovascular:      Rate and Rhythm: Normal rate and regular rhythm.   Pulmonary:      Effort: Pulmonary effort is normal.      Breath sounds: Normal breath sounds.   Neurological:      General: No focal deficit present.      Mental Status: She is alert.      Cranial Nerves: No cranial nerve deficit.      Coordination: Coordination normal.           Assessment/Plan   Diagnoses and all orders for this visit:    1. Memory difficulty (Primary)  -     Ambulatory Referral to Psychology      Rosi is here today for some problems with her memory.  It is hard to say whether this is a learning disability or just a language barrier.  I am going to start with a evaluation by clinical psychologist.

## 2021-06-19 ENCOUNTER — APPOINTMENT (OUTPATIENT)
Dept: GENERAL RADIOLOGY | Facility: HOSPITAL | Age: 51
End: 2021-06-19

## 2021-06-19 ENCOUNTER — HOSPITAL ENCOUNTER (EMERGENCY)
Facility: HOSPITAL | Age: 51
Discharge: HOME | End: 2021-06-19
Attending: EMERGENCY MEDICINE | Admitting: EMERGENCY MEDICINE

## 2021-06-19 VITALS
HEART RATE: 85 BPM | DIASTOLIC BLOOD PRESSURE: 88 MMHG | RESPIRATION RATE: 16 BRPM | HEIGHT: 62 IN | SYSTOLIC BLOOD PRESSURE: 147 MMHG | WEIGHT: 178.2 LBS | BODY MASS INDEX: 32.79 KG/M2 | TEMPERATURE: 99.2 F | OXYGEN SATURATION: 94 %

## 2021-06-19 DIAGNOSIS — M70.61 GREATER TROCHANTERIC BURSITIS OF RIGHT HIP: Primary | ICD-10-CM

## 2021-06-19 LAB
ANION GAP SERPL CALCULATED.3IONS-SCNC: 8.5 MMOL/L (ref 5–15)
BASOPHILS # BLD AUTO: 0.03 10*3/MM3 (ref 0–0.2)
BASOPHILS NFR BLD AUTO: 0.3 % (ref 0–1.5)
BUN SERPL-MCNC: 12 MG/DL (ref 6–20)
BUN/CREAT SERPL: 18.5 (ref 7–25)
CALCIUM SPEC-SCNC: 9.1 MG/DL (ref 8.6–10.5)
CHLORIDE SERPL-SCNC: 101 MMOL/L (ref 98–107)
CO2 SERPL-SCNC: 27.5 MMOL/L (ref 22–29)
CREAT SERPL-MCNC: 0.65 MG/DL (ref 0.57–1)
DEPRECATED RDW RBC AUTO: 40.5 FL (ref 37–54)
EOSINOPHIL # BLD AUTO: 0.05 10*3/MM3 (ref 0–0.4)
EOSINOPHIL NFR BLD AUTO: 0.4 % (ref 0.3–6.2)
ERYTHROCYTE [DISTWIDTH] IN BLOOD BY AUTOMATED COUNT: 13.1 % (ref 12.3–15.4)
GFR SERPL CREATININE-BSD FRML MDRD: 96 ML/MIN/1.73
GLUCOSE SERPL-MCNC: 109 MG/DL (ref 65–99)
HCT VFR BLD AUTO: 39.6 % (ref 34–46.6)
HGB BLD-MCNC: 13 G/DL (ref 12–15.9)
IMM GRANULOCYTES # BLD AUTO: 0.03 10*3/MM3 (ref 0–0.05)
IMM GRANULOCYTES NFR BLD AUTO: 0.3 % (ref 0–0.5)
LYMPHOCYTES # BLD AUTO: 1.57 10*3/MM3 (ref 0.7–3.1)
LYMPHOCYTES NFR BLD AUTO: 14 % (ref 19.6–45.3)
MCH RBC QN AUTO: 28.1 PG (ref 26.6–33)
MCHC RBC AUTO-ENTMCNC: 32.8 G/DL (ref 31.5–35.7)
MCV RBC AUTO: 85.7 FL (ref 79–97)
MONOCYTES # BLD AUTO: 0.56 10*3/MM3 (ref 0.1–0.9)
MONOCYTES NFR BLD AUTO: 5 % (ref 5–12)
NEUTROPHILS NFR BLD AUTO: 8.96 10*3/MM3 (ref 1.7–7)
NEUTROPHILS NFR BLD AUTO: 80 % (ref 42.7–76)
NRBC BLD AUTO-RTO: 0 /100 WBC (ref 0–0.2)
PLATELET # BLD AUTO: 311 10*3/MM3 (ref 140–450)
PMV BLD AUTO: 9.9 FL (ref 6–12)
POTASSIUM SERPL-SCNC: 4.1 MMOL/L (ref 3.5–5.2)
RBC # BLD AUTO: 4.62 10*6/MM3 (ref 3.77–5.28)
SODIUM SERPL-SCNC: 137 MMOL/L (ref 136–145)
WBC # BLD AUTO: 11.2 10*3/MM3 (ref 3.4–10.8)

## 2021-06-19 PROCEDURE — 99283 EMERGENCY DEPT VISIT LOW MDM: CPT

## 2021-06-19 PROCEDURE — 73552 X-RAY EXAM OF FEMUR 2/>: CPT

## 2021-06-19 PROCEDURE — 96374 THER/PROPH/DIAG INJ IV PUSH: CPT

## 2021-06-19 PROCEDURE — 25010000002 KETOROLAC TROMETHAMINE PER 15 MG: Performed by: EMERGENCY MEDICINE

## 2021-06-19 PROCEDURE — 72110 X-RAY EXAM L-2 SPINE 4/>VWS: CPT

## 2021-06-19 PROCEDURE — 85025 COMPLETE CBC W/AUTO DIFF WBC: CPT | Performed by: EMERGENCY MEDICINE

## 2021-06-19 PROCEDURE — 73502 X-RAY EXAM HIP UNI 2-3 VIEWS: CPT

## 2021-06-19 PROCEDURE — 25010000002 MORPHINE PER 10 MG: Performed by: EMERGENCY MEDICINE

## 2021-06-19 PROCEDURE — 96375 TX/PRO/DX INJ NEW DRUG ADDON: CPT

## 2021-06-19 PROCEDURE — 25010000002 ONDANSETRON PER 1 MG: Performed by: EMERGENCY MEDICINE

## 2021-06-19 PROCEDURE — 80048 BASIC METABOLIC PNL TOTAL CA: CPT | Performed by: EMERGENCY MEDICINE

## 2021-06-19 RX ORDER — KETOROLAC TROMETHAMINE 15 MG/ML
15 INJECTION, SOLUTION INTRAMUSCULAR; INTRAVENOUS ONCE
Status: COMPLETED | OUTPATIENT
Start: 2021-06-19 | End: 2021-06-19

## 2021-06-19 RX ORDER — LIDOCAINE 50 MG/G
1 PATCH TOPICAL
Status: DISCONTINUED | OUTPATIENT
Start: 2021-06-19 | End: 2021-06-19 | Stop reason: HOSPADM

## 2021-06-19 RX ORDER — LIDOCAINE 50 MG/G
1 PATCH TOPICAL EVERY 24 HOURS
Qty: 30 EACH | Refills: 0 | Status: SHIPPED | OUTPATIENT
Start: 2021-06-19 | End: 2022-04-11

## 2021-06-19 RX ORDER — HYDROCODONE BITARTRATE AND ACETAMINOPHEN 10; 325 MG/1; MG/1
1 TABLET ORAL EVERY 4 HOURS PRN
Qty: 10 TABLET | Refills: 0 | Status: SHIPPED | OUTPATIENT
Start: 2021-06-19 | End: 2022-04-11

## 2021-06-19 RX ORDER — ONDANSETRON 2 MG/ML
4 INJECTION INTRAMUSCULAR; INTRAVENOUS ONCE
Status: COMPLETED | OUTPATIENT
Start: 2021-06-19 | End: 2021-06-19

## 2021-06-19 RX ORDER — NAPROXEN 500 MG/1
500 TABLET ORAL 2 TIMES DAILY WITH MEALS
Qty: 14 TABLET | Refills: 0 | Status: SHIPPED | OUTPATIENT
Start: 2021-06-19 | End: 2021-12-24 | Stop reason: SDUPTHER

## 2021-06-19 RX ORDER — MORPHINE SULFATE 2 MG/ML
4 INJECTION, SOLUTION INTRAMUSCULAR; INTRAVENOUS ONCE
Status: COMPLETED | OUTPATIENT
Start: 2021-06-19 | End: 2021-06-19

## 2021-06-19 RX ORDER — OXYCODONE HYDROCHLORIDE AND ACETAMINOPHEN 5; 325 MG/1; MG/1
1 TABLET ORAL ONCE
Status: COMPLETED | OUTPATIENT
Start: 2021-06-19 | End: 2021-06-19

## 2021-06-19 RX ADMIN — OXYCODONE HYDROCHLORIDE AND ACETAMINOPHEN 1 TABLET: 5; 325 TABLET ORAL at 19:21

## 2021-06-19 RX ADMIN — MORPHINE SULFATE 4 MG: 2 INJECTION, SOLUTION INTRAMUSCULAR; INTRAVENOUS at 16:06

## 2021-06-19 RX ADMIN — LIDOCAINE 1 PATCH: 50 PATCH TOPICAL at 19:20

## 2021-06-19 RX ADMIN — KETOROLAC TROMETHAMINE 15 MG: 15 INJECTION, SOLUTION INTRAMUSCULAR; INTRAVENOUS at 19:20

## 2021-06-19 RX ADMIN — ONDANSETRON 4 MG: 2 INJECTION INTRAMUSCULAR; INTRAVENOUS at 16:05

## 2021-06-19 NOTE — ED NOTES
This RN wearing all appropriate PPE during patient encounter. Hand hygiene performed before and during entering room.       Molly Farnsworth, RN  06/19/21 6648

## 2021-06-19 NOTE — ED TRIAGE NOTES
Pt arrived with complaints of right leg pain that started yesterday. Pt denies injury.  Pt speaks Latvian. Son is in the Car and would like to be updated     [facility]- [phone]    Pt was wearing a mask during assessment.  This RN wore appropriate PPE

## 2021-06-19 NOTE — DISCHARGE INSTRUCTIONS
Take meds as prescribed.  Ice and rest for 48 hours.  Follow-up with your doctor if not better in 2 days.

## 2021-06-19 NOTE — ED PROVIDER NOTES
EMERGENCY DEPARTMENT ENCOUNTER    Room Number:  11/11  Date of encounter:  6/19/2021  PCP: Floyd Rodriguez MD  Historian: Patient      HPI:  Chief Complaint: Right leg pain    A complete HPI/ROS/PMH/PSH/SH/FH are unobtainable due to: The patient speaks little English    Context: Rosi Singleton is a 50 y.o. female who presents to the ED c/o acute onset of right leg pain when she awoke this morning.  She states she felt fine yesterday when she awoke this morning began walking she felt pain in her lateral right thigh that radiated down to her right ankle.  She denies back pain, numbness, tingling, weakness, trauma, fevers, chills, bowel or bladder incontinence or saddle paresthesia.  She denies history of pain like this before.  The patient states it hurts when she bears weight or moves her right leg or when she lies on her right leg.      PAST MEDICAL HISTORY  Active Ambulatory Problems     Diagnosis Date Noted   • Arthritis 10/14/2020   • Essential (primary) hypertension 10/14/2020   • Occipital headache 10/14/2020   • Gastroesophageal reflux disease without esophagitis 10/14/2020   • Dupuytren's contracture of both hands 10/14/2020   • Hyperglycemia 10/14/2020   • Elevated transaminase level 10/14/2020   • Impaired glucose tolerance 12/17/2020     Resolved Ambulatory Problems     Diagnosis Date Noted   • Sore throat 10/14/2020     Past Medical History:   Diagnosis Date   • Hypertension          PAST SURGICAL HISTORY  History reviewed. No pertinent surgical history.      FAMILY HISTORY  History reviewed. No pertinent family history.      SOCIAL HISTORY  Social History     Socioeconomic History   • Marital status:      Spouse name: Not on file   • Number of children: Not on file   • Years of education: Not on file   • Highest education level: Not on file   Tobacco Use   • Smoking status: Never Smoker   • Smokeless tobacco: Never Used   Substance and Sexual Activity   • Alcohol use: No   • Drug  use: No   • Sexual activity: Defer         ALLERGIES  Patient has no known allergies.        REVIEW OF SYSTEMS  Review of Systems         All systems reviewed and negative except for those discussed in HPI.     PHYSICAL EXAM    I have reviewed the triage vital signs and nursing notes.    ED Triage Vitals   Temp Heart Rate Resp BP SpO2   06/19/21 1330 06/19/21 1330 06/19/21 1330 06/19/21 1332 06/19/21 1330   99.2 °F (37.3 °C) 78 16 136/93 99 %      Temp src Heart Rate Source Patient Position BP Location FiO2 (%)   06/19/21 1330 06/19/21 1330 06/19/21 1332 06/19/21 1332 --   Tympanic Monitor Sitting Left arm        GENERAL: 50-year-old well developed, well nourished in no acute distress  HENT: NCAT, neck supple, trachea midline  EYES: no scleral icterus, PERRL, normal conjunctiva  CV: regular rhythm, regular rate, no murmur  RESPIRATORY: unlabored effort, CTAB  ABDOMEN: soft, non-tender, non-distended, bowel sounds present  MUSCULOSKELETAL: no gross deformity, no pedal edema, no calf tenderness: The patient has point tenderness over her right lateral mid/proximal femur with mild swelling but no erythema or fluctuance.  She states this is the area that is most tender.  NEURO: alert,  sensory and motor function of extremities  intact, speech clear, mental status normal: The patient has a negative straight leg raise test on the left and a positive straight leg raise on the right.  She is neurovascular intact in her feet bilaterally.  She has good strength in her legs bilaterally.  SKIN: warm, dry, no rash  PSYCH:  Appropriate mood and affect      PPE  Pt does not present with symptoms for COVID19; however, I was wearing a mask and goggles throughout all patient interaction.    Vital signs and nursing notes reviewed.      LAB RESULTS  Recent Results (from the past 24 hour(s))   Basic Metabolic Panel    Collection Time: 06/19/21  3:53 PM    Specimen: Blood   Result Value Ref Range    Glucose 109 (H) 65 - 99 mg/dL    BUN 12  6 - 20 mg/dL    Creatinine 0.65 0.57 - 1.00 mg/dL    Sodium 137 136 - 145 mmol/L    Potassium 4.1 3.5 - 5.2 mmol/L    Chloride 101 98 - 107 mmol/L    CO2 27.5 22.0 - 29.0 mmol/L    Calcium 9.1 8.6 - 10.5 mg/dL    eGFR Non African Amer 96 >60 mL/min/1.73    BUN/Creatinine Ratio 18.5 7.0 - 25.0    Anion Gap 8.5 5.0 - 15.0 mmol/L   CBC Auto Differential    Collection Time: 06/19/21  3:53 PM    Specimen: Blood   Result Value Ref Range    WBC 11.20 (H) 3.40 - 10.80 10*3/mm3    RBC 4.62 3.77 - 5.28 10*6/mm3    Hemoglobin 13.0 12.0 - 15.9 g/dL    Hematocrit 39.6 34.0 - 46.6 %    MCV 85.7 79.0 - 97.0 fL    MCH 28.1 26.6 - 33.0 pg    MCHC 32.8 31.5 - 35.7 g/dL    RDW 13.1 12.3 - 15.4 %    RDW-SD 40.5 37.0 - 54.0 fl    MPV 9.9 6.0 - 12.0 fL    Platelets 311 140 - 450 10*3/mm3    Neutrophil % 80.0 (H) 42.7 - 76.0 %    Lymphocyte % 14.0 (L) 19.6 - 45.3 %    Monocyte % 5.0 5.0 - 12.0 %    Eosinophil % 0.4 0.3 - 6.2 %    Basophil % 0.3 0.0 - 1.5 %    Immature Grans % 0.3 0.0 - 0.5 %    Neutrophils, Absolute 8.96 (H) 1.70 - 7.00 10*3/mm3    Lymphocytes, Absolute 1.57 0.70 - 3.10 10*3/mm3    Monocytes, Absolute 0.56 0.10 - 0.90 10*3/mm3    Eosinophils, Absolute 0.05 0.00 - 0.40 10*3/mm3    Basophils, Absolute 0.03 0.00 - 0.20 10*3/mm3    Immature Grans, Absolute 0.03 0.00 - 0.05 10*3/mm3    nRBC 0.0 0.0 - 0.2 /100 WBC       Ordered the above labs and independently reviewed the results.        RADIOLOGY  XR Femur 2 View Right    Result Date: 6/19/2021  Patient: MARGARITA RANDLE  Time Out: 17:51 Exam(s): FILM RIGHT FEMUR EXAM:   XR Right Femur, 2 Views CLINICAL HISTORY:    Reason for exam: Lateral pain. TECHNIQUE:   Frontal and lateral views of the right femur. COMPARISON:   None FINDINGS:   Bones joints: No displaced fracture or dislocation identified.  Mild degenerative change of the right hip.  No bony lesion.  Osteopenia.  No knee joint effusion.   Soft tissues: Normal. IMPRESSION:     No displaced fracture or dislocation  identified.     Electronically signed by Nathaly Pascual M.D. on 06-19-21 at 1751    XR Spine Lumbar Complete 4+VW    Result Date: 6/19/2021  Lumbar spine: AP, lateral, bilateral oblique, spot lumbosacral  HISTORY: Right lower back pain.  FINDINGS: There is moderate endplate spurring within the lumbar spine. There is no evidence for fracture or malalignment. Mild facet arthritis is present in the lower lumbar spine. The soft tissues appear within normal limits.      Mild endplate spurring in the lumbar spine. No evidence for fracture or malalignment. Mild facet arthritis lower lumbar spine.  This report was finalized on 6/19/2021 5:44 PM by Dr. Sea Montoya M.D.      XR Hip With or Without Pelvis 2 - 3 View Right    Result Date: 6/19/2021  Patient: MARGARITA RANDLE  Time Out: 17:51 Exam(s): FILM HIP + PELVIS EXAM:   XR Pelvis, 1 or 2 Views CLINICAL HISTORY:    Reason for exam: Right hip pain. TECHNIQUE:   Frontal view of the pelvis. COMPARISON:   None FINDINGS:   Bones joints: No displaced fracture or dislocation identified.  Mild degenerative changes of the hips and lower lumbar spine.  Osteopenia.  No bony lesion.   Soft tissues: Normal.   Other: Presumed phleboliths in the pelvis. IMPRESSION:     No displaced fracture or dislocation identified.     Electronically signed by Nathaly Pasucal M.D. on 06-19-21 at 1751      I ordered the above noted radiological studies. Independently reviewed by me and discussed with radiologist.  See dictation above for official radiology interpretation.      PROCEDURES    Procedures        MEDICATIONS GIVEN IN ER    Medications   morphine injection 4 mg (4 mg Intravenous Given 6/19/21 1606)   ondansetron (ZOFRAN) injection 4 mg (4 mg Intravenous Given 6/19/21 1605)   ketorolac (TORADOL) injection 15 mg (15 mg Intravenous Given 6/19/21 1920)   oxyCODONE-acetaminophen (PERCOCET) 5-325 MG per tablet 1 tablet (1 tablet Oral Given 6/19/21 1921)         PROGRESS, DATA ANALYSIS,  CONSULTS, AND MEDICAL DECISION MAKING    All labs have been independently reviewed by me.  All radiology studies have been reviewed by me and discussed with radiologist dictating report.   EKG's independently reviewed by me.  Discussion below represents my analysis of pertinent findings related to patient's condition, differential diagnosis, treatment plan and final disposition.      ED Course as of Jun 19 2341   Sat Jun 19, 2021   1532 I will check x-rays, labs and provide the patient with pain medicine while awaiting her work-up.    [GP]   1819 The patient's L-spine, pelvis, right hip and right femur show no acute fracture dislocation.  However I discussed the patient's images with Dr. Montoya from radiology and the patient does have evidence of calcific tendinitis over her right greater trochanter where she is point tender on her examination.    [GP]   1834 The patient still complains of point tenderness over her right greater trochanter.  I will give her a dose of Toradol, Percocet and place a Lidoderm patch.  I explained to her son who translated to her that she has a greater trochanteric tendinitis/bursitis    [GP]   1939 The patient now feels better with better range of motion.  I explained to them that I will place her on NSAIDs, short course of narcotics,apply ice and rest for 48 hours    [GP]      ED Course User Index  [GP] Sea Maurer MD           The differential diagnosis includes but is not limited to fracture, dislocation, lumbar radiculopathy, tendinitis, bursitis or contusion        AS OF 23:41 EDT VITALS:    BP - 147/88  HR - 85  TEMP - 99.2 °F (37.3 °C) (Tympanic)  02 SATS - 94%        DIAGNOSIS  Final diagnoses:   Greater trochanteric bursitis of right hip         DISPOSITION  DISCHARGE    Patient discharged in stable condition.    Reviewed implications of results, diagnosis, meds, responsibility to follow up, warning signs and symptoms of possible worsening, potential complications and  reasons to return to ER, including increased pain, fever or increased swelling.    Patient/Family voiced understanding of above instructions.    Discussed plan for discharge, as there is no emergent indication for admission.  Pt/family is agreeable and understands need for follow up and repeat testing.  Pt is aware that discharge does not mean that nothing is wrong but it indicates no emergency is present and they must continue care with follow-up as given below or physician of their choice.     FOLLOW-UP  Floyd Rodriguez MD  [facility] [place] [id number] [phone]    In 2 days  If symptoms worsen              EMR Dragon/Transcription disclaimer:   Much of this encounter note is an electronic transcription/translation of spoken language to printed text.        Sea Maurer MD  06/19/21 9865

## 2021-06-19 NOTE — ED NOTES
This RN wearing all appropriate PPE during patient encounter. Hand hygiene performed before and during entering room.       Molly Farnsworth, RN  06/19/21 3017

## 2021-06-21 ENCOUNTER — TELEPHONE (OUTPATIENT)
Dept: FAMILY MEDICINE CLINIC | Facility: CLINIC | Age: 51
End: 2021-06-21

## 2021-06-21 NOTE — TELEPHONE ENCOUNTER
Caller: Ivette Singleton    Relationship: Emergency Contact    Best call back number: [phone]    What form or medical record are you requesting: FMLA    Who is requesting this form or medical record from you: PATIENT     How would you like to receive the form or medical records (pick-up, mail, fax):   If fax, what is the fax number:  If mail, what is the address:  If pick-up, provide patient with address and location details    Timeframe paperwork needed: ASAP    Additional notes:   PATIENT'S DAUGHTER IN LAW CALLED IN AND WANTED TO KNOW IF THE PAPERWORK FROM [facility]'S HAS BEEN RECEIVED. SHE WAS TOLD IT WOULD BE FAXED OVER Friday. PLEASE CALL AND ADVISE.

## 2021-07-21 ENCOUNTER — OFFICE VISIT (OUTPATIENT)
Dept: FAMILY MEDICINE CLINIC | Facility: CLINIC | Age: 51
End: 2021-07-21

## 2021-07-21 VITALS
HEIGHT: 62 IN | DIASTOLIC BLOOD PRESSURE: 100 MMHG | RESPIRATION RATE: 18 BRPM | HEART RATE: 80 BPM | TEMPERATURE: 97.1 F | WEIGHT: 177.4 LBS | OXYGEN SATURATION: 96 % | SYSTOLIC BLOOD PRESSURE: 132 MMHG | BODY MASS INDEX: 32.65 KG/M2

## 2021-07-21 DIAGNOSIS — M54.6 ACUTE RIGHT-SIDED THORACIC BACK PAIN: ICD-10-CM

## 2021-07-21 DIAGNOSIS — M15.9 PRIMARY OSTEOARTHRITIS INVOLVING MULTIPLE JOINTS: Primary | ICD-10-CM

## 2021-07-21 DIAGNOSIS — M75.42 IMPINGEMENT SYNDROME OF LEFT SHOULDER: ICD-10-CM

## 2021-07-21 PROCEDURE — 99213 OFFICE O/P EST LOW 20 MIN: CPT | Performed by: INTERNAL MEDICINE

## 2021-07-21 NOTE — PROGRESS NOTES
Subjective Chief complaint is a leg problem and NANCI Singleton is a 50 y.o. female.     History of Present Illness Rosi is here today for multiple issues.  She recently had to go to the emergency room for pain in her right leg.  She was having difficulty bearing weight.  She had x-rays of the knee which basically looked okay other than some minor arthritic change.  Her hips looked okay.  There may have been some calcifications in the region of the ITB and trochanteric bursa on the right.  She also is experiencing some left shoulder pain.  This may have occurred after doing some repetitive work with the shoulder.  She has problems with her wrist in terms of osteoarthritis of the wrist and possibly carpal tunnel.  She also is experiencing some right-sided back pain below the right scapula.    The following portions of the patient's history were reviewed and updated as appropriate: allergies, current medications, past family history, past medical history, past social history, past surgical history and problem list.    Review of Systems   Constitutional: Negative for chills and fever.   Musculoskeletal: Positive for arthralgias and back pain.       Objective   Physical Exam  Vitals and nursing note reviewed.   Constitutional:       Appearance: Normal appearance.   Cardiovascular:      Rate and Rhythm: Normal rate.   Pulmonary:      Effort: Pulmonary effort is normal.   Musculoskeletal:      Comments: Her right shoulder has good abduction and external rotation.  There is some diminished abduction to approximately 70 degrees on the left.  There is pain posteriorly with this.  External rotation worsens this pain.  She also has some pain with internal rotation.  She has good flexion and extension of her knees.  There is mild decreased internal rotation of the hips and there is tenderness to the right greater trochanter.  She does have some tenderness to palpation at the lower lateral border of the right  scapula.   Neurological:      Mental Status: She is alert.           Assessment/Plan   Diagnoses and all orders for this visit:    1. Primary osteoarthritis involving multiple joints (Primary)    2. Impingement syndrome of left shoulder  -     Ambulatory Referral to Orthopedic Surgery    3. Acute right-sided thoracic back pain      Rosi is here today for multiple arthritic complaints.  I am going to refer her to an orthopedist for her shoulder.  She may be developing some degree of impingement there.  We did fill out appropriate [facility] paperwork.  There is still struggling to get her tested for her citizenship.

## 2021-09-03 ENCOUNTER — IMMUNIZATION (OUTPATIENT)
Dept: VACCINE CLINIC | Facility: HOSPITAL | Age: 51
End: 2021-09-03

## 2021-09-03 PROCEDURE — 0001A: CPT | Performed by: INTERNAL MEDICINE

## 2021-09-03 PROCEDURE — 91300 HC SARSCOV02 VAC 30MCG/0.3ML IM: CPT | Performed by: INTERNAL MEDICINE

## 2021-09-24 ENCOUNTER — IMMUNIZATION (OUTPATIENT)
Dept: VACCINE CLINIC | Facility: HOSPITAL | Age: 51
End: 2021-09-24

## 2021-09-24 PROCEDURE — 0002A: CPT | Performed by: INTERNAL MEDICINE

## 2021-09-24 PROCEDURE — 91300 HC SARSCOV02 VAC 30MCG/0.3ML IM: CPT | Performed by: INTERNAL MEDICINE

## 2021-12-16 ENCOUNTER — OFFICE VISIT (OUTPATIENT)
Dept: FAMILY MEDICINE CLINIC | Facility: CLINIC | Age: 51
End: 2021-12-16

## 2021-12-16 VITALS
HEART RATE: 70 BPM | SYSTOLIC BLOOD PRESSURE: 140 MMHG | WEIGHT: 177 LBS | DIASTOLIC BLOOD PRESSURE: 90 MMHG | BODY MASS INDEX: 32.57 KG/M2 | HEIGHT: 62 IN | OXYGEN SATURATION: 98 %

## 2021-12-16 DIAGNOSIS — I10 ESSENTIAL (PRIMARY) HYPERTENSION: ICD-10-CM

## 2021-12-16 DIAGNOSIS — Z12.31 ENCOUNTER FOR SCREENING MAMMOGRAM FOR MALIGNANT NEOPLASM OF BREAST: ICD-10-CM

## 2021-12-16 DIAGNOSIS — R51.9 NONINTRACTABLE EPISODIC HEADACHE, UNSPECIFIED HEADACHE TYPE: Primary | ICD-10-CM

## 2021-12-16 PROCEDURE — 99214 OFFICE O/P EST MOD 30 MIN: CPT | Performed by: INTERNAL MEDICINE

## 2021-12-16 RX ORDER — METOPROLOL SUCCINATE 25 MG/1
25 TABLET, EXTENDED RELEASE ORAL DAILY
Qty: 30 TABLET | Refills: 5 | Status: SHIPPED | OUTPATIENT
Start: 2021-12-16 | End: 2022-04-11

## 2021-12-16 NOTE — PROGRESS NOTES
Subjective Chief complaint is headaches  Rosi Singleton is a 51 y.o. female.     History of Present Illness Rosi is here today for headaches.  This only seems to occur at night.  It does sometimes wake her from sleep.  She takes her blood pressure and it is elevated.  However drinking coffee and sometimes eating something or walking around seem to make it better.  She is not having any focal neurologic signs with that.  She has not been taking her metoprolol for her blood pressure.  Her blood pressure today was 140/90.    The following portions of the patient's history were reviewed and updated as appropriate: allergies, current medications, past family history, past medical history, past social history, past surgical history and problem list.    Review of Systems   Constitutional: Negative for chills and fever.   Gastrointestinal: Positive for indigestion.   Neurological: Positive for headache.       Objective   Physical Exam  Vitals and nursing note reviewed.   Eyes:      Funduscopic exam:     Right eye: No papilledema.         Left eye: No papilledema.   Cardiovascular:      Rate and Rhythm: Normal rate and regular rhythm.      Pulses: Normal pulses.   Pulmonary:      Effort: Pulmonary effort is normal.      Breath sounds: Normal breath sounds.   Neurological:      General: No focal deficit present.      Mental Status: She is alert.      Cranial Nerves: No cranial nerve deficit.           Assessment/Plan   Diagnoses and all orders for this visit:    1. Nonintractable episodic headache, unspecified headache type (Primary)    2. Essential (primary) hypertension    3. Encounter for screening mammogram for malignant neoplasm of breast  -     Cancel: Mammo Screening Bilateral With CAD; Future    Other orders  -     metoprolol succinate XL (Toprol XL) 25 MG 24 hr tablet; Take 1 tablet by mouth Daily.  Dispense: 30 tablet; Refill: 5      Rosi is here today for headaches.  These seem to be occurring at  night.  Her blood pressure is elevated when she has them.  She is not taking her metoprolol any longer.  I am going to reinstitute this.  I am going to see her back in 3 weeks.  If her blood pressure is better and her headaches are gone no further work-up will be planned.  However if her blood pressure is normal but she is still having headaches we may pursue further work-up with a scan of her head.

## 2021-12-24 ENCOUNTER — HOSPITAL ENCOUNTER (EMERGENCY)
Facility: HOSPITAL | Age: 51
Discharge: HOME | End: 2021-12-25
Attending: EMERGENCY MEDICINE | Admitting: EMERGENCY MEDICINE

## 2021-12-24 DIAGNOSIS — G56.03 BILATERAL CARPAL TUNNEL SYNDROME: ICD-10-CM

## 2021-12-24 DIAGNOSIS — M19.041 OSTEOARTHRITIS OF RIGHT HAND, UNSPECIFIED OSTEOARTHRITIS TYPE: Primary | ICD-10-CM

## 2021-12-24 PROCEDURE — 99282 EMERGENCY DEPT VISIT SF MDM: CPT

## 2021-12-24 PROCEDURE — 96372 THER/PROPH/DIAG INJ SC/IM: CPT

## 2021-12-24 RX ORDER — NAPROXEN 500 MG/1
500 TABLET ORAL 2 TIMES DAILY WITH MEALS
Qty: 14 TABLET | Refills: 0 | Status: SHIPPED | OUTPATIENT
Start: 2021-12-24 | End: 2022-04-11

## 2021-12-24 RX ORDER — KETOROLAC TROMETHAMINE 30 MG/ML
30 INJECTION, SOLUTION INTRAMUSCULAR; INTRAVENOUS ONCE
Status: COMPLETED | OUTPATIENT
Start: 2021-12-24 | End: 2021-12-25

## 2021-12-25 VITALS
OXYGEN SATURATION: 93 % | BODY MASS INDEX: 29.44 KG/M2 | HEIGHT: 62 IN | WEIGHT: 160 LBS | TEMPERATURE: 98.5 F | DIASTOLIC BLOOD PRESSURE: 90 MMHG | SYSTOLIC BLOOD PRESSURE: 141 MMHG | HEART RATE: 77 BPM | RESPIRATION RATE: 16 BRPM

## 2021-12-25 PROCEDURE — 25010000002 KETOROLAC TROMETHAMINE PER 15 MG: Performed by: EMERGENCY MEDICINE

## 2021-12-25 PROCEDURE — 96372 THER/PROPH/DIAG INJ SC/IM: CPT

## 2021-12-25 RX ADMIN — KETOROLAC TROMETHAMINE 30 MG: 30 INJECTION, SOLUTION INTRAMUSCULAR; INTRAVENOUS at 00:05

## 2021-12-25 NOTE — ED NOTES
Pt ambulatory to triage from home with c/o right hand pain, which she states goes numb at night.  Pt had similar events a few months, dx'd with arthritis.  Pt wearing mask in triage.  Triage personnel wore appropriate PPE    Pt's friend is translating - pt speaks some english, but primary language is Cuban.     Talisha Rosas, RN  12/24/21 1443

## 2021-12-25 NOTE — ED NOTES
.RN wearing all appropriate PPE during entire encounter with patient.        Behringer, Catherine, FRANCES  12/25/21 0030

## 2021-12-25 NOTE — ED PROVIDER NOTES
EMERGENCY DEPARTMENT ENCOUNTER    Room Number:  22/22  Date of encounter:  12/24/2021  PCP: Floyd Rodriguez MD  Historian: Patient and niece      HPI:  Chief Complaint: Hand pain  A complete HPI/ROS/PMH/PSH/SH/FH are unobtainable due to: N/A    Context: Rosi Singleton is a 51 y.o. female who presents to the ED c/o right hand pain for the past 3 to 4 days.  She is right-handed.  She complains of pain in the metacarpophalangeal joints with minimal swelling.  No recent injuries.  No erythema.  No fevers or chills.  She has had these problems in the past and her niece tells me that she has had her knuckles injected.  Also, it appears she had a nerve conduction study earlier this year (see discussion below).      The patient was placed in a mask in triage, hand hygiene was performed before and after my interaction with the patient.  I wore a mask, safety glasses and gloves during my entire interaction with the patient.    PAST MEDICAL HISTORY  Active Ambulatory Problems     Diagnosis Date Noted   • Arthritis 10/14/2020   • Essential (primary) hypertension 10/14/2020   • Occipital headache 10/14/2020   • Gastroesophageal reflux disease without esophagitis 10/14/2020   • Dupuytren's contracture of both hands 10/14/2020   • Hyperglycemia 10/14/2020   • Elevated transaminase level 10/14/2020   • Impaired glucose tolerance 12/17/2020     Resolved Ambulatory Problems     Diagnosis Date Noted   • Sore throat 10/14/2020     Past Medical History:   Diagnosis Date   • Hypertension          PAST SURGICAL HISTORY  No past surgical history on file.      FAMILY HISTORY  No family history on file.      SOCIAL HISTORY  Social History     Socioeconomic History   • Marital status:    Tobacco Use   • Smoking status: Never Smoker   • Smokeless tobacco: Never Used   Substance and Sexual Activity   • Alcohol use: No   • Drug use: No   • Sexual activity: Defer         ALLERGIES  Patient has no known  allergies.        REVIEW OF SYSTEMS  Review of Systems   Constitutional: Negative for chills and fever.   Musculoskeletal: Positive for arthralgias.   Skin: Negative for color change and rash.        All systems reviewed and negative except for those discussed in HPI.       PHYSICAL EXAM    I have reviewed the triage vital signs and nursing notes.    ED Triage Vitals [12/24/21 2333]   Temp Heart Rate Resp BP SpO2   98.5 °F (36.9 °C) 75 16 165/96 95 %      Temp src Heart Rate Source Patient Position BP Location FiO2 (%)   Tympanic Monitor Standing Left arm --       Physical Exam   Constitutional: Pt. is oriented to person, place, and time and well-developed, well-nourished, and in no distress.   Musculoskeletal: Right hand has minimal swelling of the MCP joints.  There is no erythema.  Capillary refill is brisk.  Negative Tinel's and Phalen's test.  Radial pulses and ulnar pulses are easily palpable bilaterally.  Forearm and elbow are unremarkable.  Remaining extremities exhibit normal range of motion. No edema, tenderness or deformity.   Neurological:   She has no focal neurologic deficits  Skin: Skin is warm and dry. No rash noted. Pt. is not diaphoretic. No erythema.   Psychiatric: Mood, affect normal.  She is pleasant and cooperative.  Nursing note and vitals reviewed.        LAB RESULTS  No results found for this or any previous visit (from the past 24 hour(s)).    Ordered the above labs and independently reviewed the results.        RADIOLOGY  No Radiology Exams Resulted Within Past 24 Hours    I ordered the above noted radiological studies. Reviewed by me and discussed with radiologist.  See dictation for official radiology interpretation.      PROCEDURES    Procedures      MEDICATIONS GIVEN IN ER    Medications   ketorolac (TORADOL) injection 30 mg (has no administration in time range)         PROGRESS, DATA ANALYSIS, CONSULTS, AND MEDICAL DECISION MAKING    Any/all labs have been independently reviewed by  me.  Any/all radiology studies have been reviewed by me and discussed with radiologist dictating the report.   EKG's independently viewed and interpreted by me.  Discussion below represents my analysis of pertinent findings related to patient's condition, differential diagnosis, treatment plan and final disposition.    Number of Diagnoses or Management Options     Amount and/or Complexity of Data Reviewed  Clinical lab tests:  No  Tests in the radiology section of CPT®:  No  Tests in the medicine section of CPT®:  No  Review and summarize past medical records: yes (see below)  Independent visualization of images, tracings, or specimens:  N/A      ED Course as of 12/24/21 2355   Fri Dec 24, 2021   2350 Prior record review-the patient had nerve conduction studies in March of this year that showed moderate entrapment of the median nerve at the carpal ligament bilaterally. [WC]   2354 Will refer patient back to hand surgery for further evaluation and treatment of osteoarthritis as well as probable carpal tunnel syndrome. [WC]      ED Course User Index  [WC] Dany Thompson MD       AS OF 23:55 EST VITALS:    BP - (!) 173/103  HR - 79  TEMP - 98.5 °F (36.9 °C) (Tympanic)  02 SATS - 97%        DIAGNOSIS  Final diagnoses:   Osteoarthritis of right hand, unspecified osteoarthritis type   Bilateral carpal tunnel syndrome         DISPOSITION  Discharged           Dany Thompson MD  12/24/21 2915

## 2022-04-11 ENCOUNTER — OFFICE VISIT (OUTPATIENT)
Dept: FAMILY MEDICINE CLINIC | Facility: CLINIC | Age: 52
End: 2022-04-11

## 2022-04-11 VITALS
DIASTOLIC BLOOD PRESSURE: 100 MMHG | HEIGHT: 62 IN | WEIGHT: 179.4 LBS | SYSTOLIC BLOOD PRESSURE: 144 MMHG | OXYGEN SATURATION: 98 % | HEART RATE: 79 BPM | BODY MASS INDEX: 33.01 KG/M2

## 2022-04-11 DIAGNOSIS — I10 ESSENTIAL (PRIMARY) HYPERTENSION: ICD-10-CM

## 2022-04-11 DIAGNOSIS — R51.9 PERSISTENT HEADACHES: Primary | ICD-10-CM

## 2022-04-11 DIAGNOSIS — Z12.31 ENCOUNTER FOR SCREENING MAMMOGRAM FOR MALIGNANT NEOPLASM OF BREAST: ICD-10-CM

## 2022-04-11 PROCEDURE — 99214 OFFICE O/P EST MOD 30 MIN: CPT | Performed by: INTERNAL MEDICINE

## 2022-04-11 RX ORDER — VALSARTAN AND HYDROCHLOROTHIAZIDE 80; 12.5 MG/1; MG/1
1 TABLET, FILM COATED ORAL DAILY
Qty: 30 TABLET | Refills: 5 | Status: SHIPPED | OUTPATIENT
Start: 2022-04-11 | End: 2022-05-02

## 2022-04-11 NOTE — PROGRESS NOTES
Subjective Chief complaint is headaches  Rosi Singleton is a 51 y.o. female.     History of Present Illness Rosi is here today for headaches.  These occur approximately 3 times per week and awaken her from sleep.  She has not been taking her blood pressure medicine because she did not feel like it was helping her headaches.  We really did not get her back to see me to follow-up on the actual blood pressure reading after taking the medicine.  She is also complaining of some pain behind her left knee.  She feels something around there.    The following portions of the patient's history were reviewed and updated as appropriate: allergies, current medications, past family history, past medical history, past social history, past surgical history and problem list.    Review of Systems   Constitutional: Negative for chills and fever.   Respiratory: Negative for chest tightness and shortness of breath.        Objective   Physical Exam  Constitutional:       Appearance: Normal appearance.   Neck:      Vascular: No carotid bruit.   Cardiovascular:      Rate and Rhythm: Normal rate and regular rhythm.      Comments: Blood pressure to my exam is 148/106  Musculoskeletal:      Right lower leg: No edema.      Left lower leg: No edema.      Comments: He has a palpable Baker's cyst on the left.   Neurological:      General: No focal deficit present.      Mental Status: She is alert.      Cranial Nerves: No cranial nerve deficit.           Assessment/Plan   Diagnoses and all orders for this visit:    1. Persistent headaches (Primary)  -     MRI Brain With & Without Contrast; Future    2. Essential (primary) hypertension    3. Encounter for screening mammogram for malignant neoplasm of breast  -     Mammo Screening Bilateral With CAD; Future    Other orders  -     valsartan-hydrochlorothiazide (Diovan HCT) 80-12.5 MG per tablet; Take 1 tablet by mouth Daily.  Dispense: 30 tablet; Refill: 5      Rosi is here today for  headaches.  It is hard to say whether headaches are coming from the blood pressure whether something else could be going on.  Because they are waking her at night I am going to get an MRI scan of the brain.  Her neurologic exam is normal.  I am going to try her on some valsartan/hydrochlorothiazide because she did not like the way the metoprolol made her feel.  Did advise them to make a follow-up appointment in 3 weeks and keep that appointment.  They did not keep her last appointment for follow-up on blood pressure.

## 2022-04-19 ENCOUNTER — TELEPHONE (OUTPATIENT)
Dept: FAMILY MEDICINE CLINIC | Facility: CLINIC | Age: 52
End: 2022-04-19

## 2022-04-19 NOTE — TELEPHONE ENCOUNTER
Caller:DOUG ()    Relationship: DAUGHTER IN LAW   Best call back number: [phone]    What orders are you requesting (i.e. lab or imaging): MAMMOGRAM AND MRI     In what timeframe would the patient need to come in: ADVISE    Where will you receive your lab/imaging services: [facility]     Additional notes:     PLEASE CALL AND ADVISE

## 2022-04-20 NOTE — TELEPHONE ENCOUNTER
Called daughter in law and notified her of what Dr. Rodriguez said and gave her the phone number to scheduling.

## 2022-04-27 ENCOUNTER — TRANSCRIBE ORDERS (OUTPATIENT)
Dept: ADMINISTRATIVE | Facility: HOSPITAL | Age: 52
End: 2022-04-27

## 2022-04-27 DIAGNOSIS — Z12.31 SCREENING MAMMOGRAM, ENCOUNTER FOR: Primary | ICD-10-CM

## 2022-05-02 ENCOUNTER — OFFICE VISIT (OUTPATIENT)
Dept: FAMILY MEDICINE CLINIC | Facility: CLINIC | Age: 52
End: 2022-05-02

## 2022-05-02 VITALS
WEIGHT: 179 LBS | DIASTOLIC BLOOD PRESSURE: 88 MMHG | OXYGEN SATURATION: 97 % | HEIGHT: 62 IN | BODY MASS INDEX: 32.94 KG/M2 | HEART RATE: 80 BPM | SYSTOLIC BLOOD PRESSURE: 120 MMHG

## 2022-05-02 DIAGNOSIS — R10.13 EPIGASTRIC PAIN: ICD-10-CM

## 2022-05-02 DIAGNOSIS — I10 ESSENTIAL (PRIMARY) HYPERTENSION: Primary | ICD-10-CM

## 2022-05-02 DIAGNOSIS — R51.9 NONINTRACTABLE EPISODIC HEADACHE, UNSPECIFIED HEADACHE TYPE: ICD-10-CM

## 2022-05-02 DIAGNOSIS — Z78.9 MEDICATION INTOLERANCE: ICD-10-CM

## 2022-05-02 PROCEDURE — 99214 OFFICE O/P EST MOD 30 MIN: CPT | Performed by: INTERNAL MEDICINE

## 2022-05-02 RX ORDER — AMLODIPINE BESYLATE 2.5 MG/1
2.5 TABLET ORAL DAILY
Qty: 15 TABLET | Refills: 1 | Status: SHIPPED | OUTPATIENT
Start: 2022-05-02 | End: 2022-05-16 | Stop reason: SINTOL

## 2022-05-02 NOTE — PROGRESS NOTES
Subjective Chief complaint is follow-up on blood pressure  Rosi Singleton is a 51 y.o. female.     History of Present Illness Rosi is here today for follow-up on her blood pressure.  Unfortunately she could not tolerate the valsartan.  She did not tolerate metoprolol.  She reports on the valsartan she took the first dose.  It made her stomach feel nauseated.  She waited a few days and then tried again and the same thing happened.  Her blood pressure today to my exam is 140/92 she is continuing to have headaches.  She reports that her stomach still feels tight.    The following portions of the patient's history were reviewed and updated as appropriate: allergies, current medications, past family history, past medical history, past social history, past surgical history and problem list.    Review of Systems   Constitutional: Negative for chills and fever.   Gastrointestinal: Positive for abdominal pain and nausea.       Objective   Physical Exam  Vitals and nursing note reviewed.   Constitutional:       Appearance: Normal appearance.   Cardiovascular:      Rate and Rhythm: Normal rate and regular rhythm.   Pulmonary:      Effort: Pulmonary effort is normal.      Breath sounds: No wheezing, rhonchi or rales.   Chest:      Chest wall: Tenderness present.   Abdominal:      General: Bowel sounds are normal.      Palpations: Abdomen is soft.      Tenderness: There is abdominal tenderness.      Comments: She has some tenderness in the midepigastric area that may be coming more from the xiphoid.   Neurological:      Mental Status: She is alert.           Assessment/Plan   Diagnoses and all orders for this visit:    1. Essential (primary) hypertension (Primary)    2. Medication intolerance    3. Nonintractable episodic headache, unspecified headache type    4. Epigastric pain  -     CT abdomen wo contrast; Future    Other orders  -     amLODIPine (NORVASC) 2.5 MG tablet; Take 1 tablet by mouth Daily.  Dispense: 15  tablet; Refill: 1    Rosi  Is following up today on blood pressure.  She is not tolerating medicines very well.  She did not tolerate metoprolol.  She did not tolerate valsartan/hydrochlorothiazide.  We are going to try her on some very low-dose amlodipine.

## 2022-05-08 ENCOUNTER — HOSPITAL ENCOUNTER (OUTPATIENT)
Dept: MRI IMAGING | Facility: HOSPITAL | Age: 52
Discharge: HOME | End: 2022-05-08
Admitting: INTERNAL MEDICINE

## 2022-05-08 DIAGNOSIS — R51.9 PERSISTENT HEADACHES: ICD-10-CM

## 2022-05-08 PROCEDURE — 70553 MRI BRAIN STEM W/O & W/DYE: CPT

## 2022-05-08 PROCEDURE — A9577 INJ MULTIHANCE: HCPCS | Performed by: INTERNAL MEDICINE

## 2022-05-08 PROCEDURE — 0 GADOBENATE DIMEGLUMINE 529 MG/ML SOLUTION: Performed by: INTERNAL MEDICINE

## 2022-05-08 PROCEDURE — 82565 ASSAY OF CREATININE: CPT

## 2022-05-08 RX ADMIN — GADOBENATE DIMEGLUMINE 16 ML: 529 INJECTION, SOLUTION INTRAVENOUS at 21:06

## 2022-05-11 LAB — CREAT BLDA-MCNC: 0.8 MG/DL (ref 0.6–1.3)

## 2022-05-16 ENCOUNTER — OFFICE VISIT (OUTPATIENT)
Dept: FAMILY MEDICINE CLINIC | Facility: CLINIC | Age: 52
End: 2022-05-16

## 2022-05-16 VITALS
SYSTOLIC BLOOD PRESSURE: 126 MMHG | OXYGEN SATURATION: 98 % | WEIGHT: 179 LBS | DIASTOLIC BLOOD PRESSURE: 88 MMHG | BODY MASS INDEX: 32.94 KG/M2 | HEIGHT: 62 IN | HEART RATE: 85 BPM

## 2022-05-16 DIAGNOSIS — I10 ESSENTIAL (PRIMARY) HYPERTENSION: Primary | ICD-10-CM

## 2022-05-16 DIAGNOSIS — R10.13 EPIGASTRIC PAIN: ICD-10-CM

## 2022-05-16 DIAGNOSIS — R11.0 NAUSEA: ICD-10-CM

## 2022-05-16 DIAGNOSIS — K21.9 GASTROESOPHAGEAL REFLUX DISEASE WITHOUT ESOPHAGITIS: ICD-10-CM

## 2022-05-16 DIAGNOSIS — Z91.14 MEDICATION NONADHERENCE DUE TO INTOLERANCE: ICD-10-CM

## 2022-05-16 PROCEDURE — 99213 OFFICE O/P EST LOW 20 MIN: CPT | Performed by: INTERNAL MEDICINE

## 2022-05-31 ENCOUNTER — APPOINTMENT (OUTPATIENT)
Dept: MRI IMAGING | Facility: HOSPITAL | Age: 52
End: 2022-05-31

## 2022-06-01 ENCOUNTER — OFFICE VISIT (OUTPATIENT)
Dept: GASTROENTEROLOGY | Facility: CLINIC | Age: 52
End: 2022-06-01

## 2022-06-01 ENCOUNTER — TELEPHONE (OUTPATIENT)
Dept: GASTROENTEROLOGY | Facility: CLINIC | Age: 52
End: 2022-06-01

## 2022-06-01 VITALS
TEMPERATURE: 97.5 F | DIASTOLIC BLOOD PRESSURE: 91 MMHG | WEIGHT: 175.6 LBS | HEIGHT: 63 IN | SYSTOLIC BLOOD PRESSURE: 146 MMHG | BODY MASS INDEX: 31.11 KG/M2

## 2022-06-01 DIAGNOSIS — R11.2 NAUSEA AND VOMITING, UNSPECIFIED VOMITING TYPE: ICD-10-CM

## 2022-06-01 DIAGNOSIS — R10.13 EPIGASTRIC PAIN: Primary | ICD-10-CM

## 2022-06-01 DIAGNOSIS — R12 HEARTBURN: ICD-10-CM

## 2022-06-01 DIAGNOSIS — Z12.11 ENCOUNTER FOR SCREENING FOR MALIGNANT NEOPLASM OF COLON: ICD-10-CM

## 2022-06-01 PROCEDURE — 99204 OFFICE O/P NEW MOD 45 MIN: CPT | Performed by: NURSE PRACTITIONER

## 2022-06-01 RX ORDER — PANTOPRAZOLE SODIUM 40 MG/1
40 TABLET, DELAYED RELEASE ORAL DAILY
Qty: 90 TABLET | Refills: 3 | Status: SHIPPED | OUTPATIENT
Start: 2022-06-01

## 2022-06-01 NOTE — TELEPHONE ENCOUNTER
HERLINDA Burch for colonoscopy on 08/30/2022  arrive at 12pm   . Gave prep instructions in office. ----miralax      Advised PT  that  will call with final arrival time  24 hrs before procedure. If they do not get a phone call, arrival time will stay the same as given on instructions

## 2022-06-01 NOTE — PROGRESS NOTES
Chief Complaint   Patient presents with   • Abdominal Pain       HPI    Rosi Singleton is a  51 y.o. female here to establish care as a new patient for complaints of abdominal pain and heartburn.    This patient will also follow with Dr. Lema.    Patient seen today accompanied by her daughter who helps translate.  Onset of symptoms approximately 3 months ago.  Patient complains of epigastric pain that comes and goes described as a burning aching sensation.  No correlation eating or defecation.  Intermittent episodes of nausea and vomiting.  Significant heartburn.  She not taking anything over-the-counter currently to treat her symptoms.  Denies poor appetite or weight loss.  No odynophagia or dysphagia.    Denies diarrhea, constipation, rectal bleeding.    She is prescheduled for CT of the abdomen June 17.    She still has her gallbladder.    No family history of colon cancer.    Past Medical History:   Diagnosis Date   • Hypertension        History reviewed. No pertinent surgical history.    Scheduled Meds:     Continuous Infusions: No current facility-administered medications for this visit.      PRN Meds:     No Known Allergies    Social History     Socioeconomic History   • Marital status:    Tobacco Use   • Smoking status: Never Smoker   • Smokeless tobacco: Never Used   Substance and Sexual Activity   • Alcohol use: No   • Drug use: No   • Sexual activity: Defer       History reviewed. No pertinent family history.    Review of Systems   Constitutional: Negative for activity change, appetite change, fatigue and unexpected weight change.   HENT: Negative for trouble swallowing.    Eyes: Negative.    Respiratory: Negative.    Cardiovascular: Negative.    Gastrointestinal: Positive for abdominal pain, nausea and vomiting. Negative for abdominal distention, anal bleeding, blood in stool, constipation, diarrhea and rectal pain.   Endocrine: Negative.    Genitourinary: Negative.    Musculoskeletal:  Negative.    Allergic/Immunologic: Negative.    Neurological: Negative.    Hematological: Negative.    Psychiatric/Behavioral: Negative.        Vitals:    06/01/22 1003   BP: 146/91   Temp: 97.5 °F (36.4 °C)       Physical Exam  Constitutional:       Appearance: She is well-developed.   Abdominal:      General: Bowel sounds are normal. There is no distension.      Palpations: Abdomen is soft. There is no mass.      Tenderness: There is abdominal tenderness. There is no guarding.      Hernia: No hernia is present.   Skin:     General: Skin is warm and dry.      Capillary Refill: Capillary refill takes less than 2 seconds.   Neurological:      Mental Status: She is alert and oriented to person, place, and time.   Psychiatric:         Behavior: Behavior normal.     Assessment    Diagnoses and all orders for this visit:    1. Epigastric pain (Primary)  -     Case Request; Standing  -     Case Request  -     CBC & Differential  -     Comprehensive Metabolic Panel  -     Celiac Comprehensive Panel  -     Food Allergy Profile    2. Nausea and vomiting, unspecified vomiting type  -     Case Request; Standing  -     Case Request  -     CBC & Differential  -     Comprehensive Metabolic Panel  -     Celiac Comprehensive Panel  -     Food Allergy Profile    3. Heartburn  -     Case Request; Standing  -     Case Request  -     CBC & Differential  -     Comprehensive Metabolic Panel  -     Celiac Comprehensive Panel  -     Food Allergy Profile    4. Encounter for screening for malignant neoplasm of colon  -     Case Request; Standing  -     Case Request    Other orders  -     pantoprazole (PROTONIX) 40 MG EC tablet; Take 1 tablet by mouth Daily.  Dispense: 90 tablet; Refill: 3       Plan    EGD and colonoscopy with Dr. Lema for the above-mentioned symptoms and screen for colon polyp/colon cancer  Start Protonix 40 mg once daily  Strict antireflux diet  Antireflux measures and dietary modifications reviewed. Low acid diet  reviewed. Keep head of bed elevated. Stop eating/drinking at least 3 hours prior to bedtime. Eliminate caffeine and carbonated beverages.  Weight loss encouraged if BMI over 25.  Labs today as above  Keep scheduled CT through PCP  Follow-up and further recommendations pending the aforementioned work-up  Consider gallbladder evaluation if warranted          CECI Wells  [facility]  [address] [place] [address]  Office: [phone]

## 2022-06-02 LAB
ALBUMIN SERPL-MCNC: 4.3 G/DL (ref 3.8–4.9)
ALBUMIN/GLOB SERPL: 1.5 {RATIO} (ref 1.2–2.2)
ALP SERPL-CCNC: 88 IU/L (ref 44–121)
ALT SERPL-CCNC: 22 IU/L (ref 0–32)
AST SERPL-CCNC: 14 IU/L (ref 0–40)
BASOPHILS # BLD AUTO: 0 X10E3/UL (ref 0–0.2)
BASOPHILS NFR BLD AUTO: 1 %
BILIRUB SERPL-MCNC: 0.3 MG/DL (ref 0–1.2)
BUN SERPL-MCNC: 12 MG/DL (ref 6–24)
BUN/CREAT SERPL: 18 (ref 9–23)
CALCIUM SERPL-MCNC: 9.5 MG/DL (ref 8.7–10.2)
CHLORIDE SERPL-SCNC: 104 MMOL/L (ref 96–106)
CO2 SERPL-SCNC: 25 MMOL/L (ref 20–29)
CREAT SERPL-MCNC: 0.68 MG/DL (ref 0.57–1)
EGFRCR SERPLBLD CKD-EPI 2021: 104 ML/MIN/1.73
ENDOMYSIUM IGA SER QL: NEGATIVE
EOSINOPHIL # BLD AUTO: 0.2 X10E3/UL (ref 0–0.4)
EOSINOPHIL NFR BLD AUTO: 3 %
ERYTHROCYTE [DISTWIDTH] IN BLOOD BY AUTOMATED COUNT: 12.7 % (ref 11.7–15.4)
GLIADIN PEPTIDE IGA SER-ACNC: 5 UNITS (ref 0–19)
GLIADIN PEPTIDE IGG SER-ACNC: 2 UNITS (ref 0–19)
GLOBULIN SER CALC-MCNC: 2.9 G/DL (ref 1.5–4.5)
GLUCOSE SERPL-MCNC: 129 MG/DL (ref 65–99)
HCT VFR BLD AUTO: 38.8 % (ref 34–46.6)
HGB BLD-MCNC: 12.9 G/DL (ref 11.1–15.9)
IGA SERPL-MCNC: 216 MG/DL (ref 87–352)
IMM GRANULOCYTES # BLD AUTO: 0 X10E3/UL (ref 0–0.1)
IMM GRANULOCYTES NFR BLD AUTO: 0 %
LYMPHOCYTES # BLD AUTO: 2.1 X10E3/UL (ref 0.7–3.1)
LYMPHOCYTES NFR BLD AUTO: 32 %
MCH RBC QN AUTO: 28.1 PG (ref 26.6–33)
MCHC RBC AUTO-ENTMCNC: 33.2 G/DL (ref 31.5–35.7)
MCV RBC AUTO: 85 FL (ref 79–97)
MONOCYTES # BLD AUTO: 0.5 X10E3/UL (ref 0.1–0.9)
MONOCYTES NFR BLD AUTO: 7 %
NEUTROPHILS # BLD AUTO: 3.8 X10E3/UL (ref 1.4–7)
NEUTROPHILS NFR BLD AUTO: 57 %
PLATELET # BLD AUTO: 321 X10E3/UL (ref 150–450)
POTASSIUM SERPL-SCNC: 4.5 MMOL/L (ref 3.5–5.2)
PROT SERPL-MCNC: 7.2 G/DL (ref 6–8.5)
RBC # BLD AUTO: 4.59 X10E6/UL (ref 3.77–5.28)
SODIUM SERPL-SCNC: 143 MMOL/L (ref 134–144)
TTG IGA SER-ACNC: <2 U/ML (ref 0–3)
TTG IGG SER-ACNC: 23 U/ML (ref 0–5)
WBC # BLD AUTO: 6.6 X10E3/UL (ref 3.4–10.8)

## 2022-06-06 ENCOUNTER — HOSPITAL ENCOUNTER (EMERGENCY)
Facility: HOSPITAL | Age: 52
End: 2022-06-06

## 2022-06-07 ENCOUNTER — TELEPHONE (OUTPATIENT)
Dept: FAMILY MEDICINE CLINIC | Facility: CLINIC | Age: 52
End: 2022-06-07

## 2022-06-07 LAB
CLAM IGE QN: <0.1 KU/L
CODFISH IGE QN: <0.1 KU/L
CONV CLASS DESCRIPTION: NORMAL
CORN IGE QN: <0.1 KU/L
COW MILK IGE QN: <0.1 KU/L
EGG WHITE IGE QN: <0.1 KU/L
PEANUT IGE QN: <0.1 KU/L
SCALLOP IGE QN: <0.1 KU/L
SESAME SEED IGE QN: <0.1 KU/L
SHRIMP IGE QN: <0.1 KU/L
SOYBEAN IGE QN: <0.1 KU/L
WALNUT IGE QN: <0.1 KU/L
WHEAT IGE QN: <0.1 KU/L

## 2022-06-07 NOTE — PROGRESS NOTES
Please inform the patient that her lab work shows a possibility of celiac disease.  This is an allergy to gluten.  I do not recommend she make any changes to her diet yet as it can skew endoscopic examination.  Await EGD and colonoscopy results.

## 2022-06-08 ENCOUNTER — TELEPHONE (OUTPATIENT)
Dept: GASTROENTEROLOGY | Facility: CLINIC | Age: 52
End: 2022-06-08

## 2022-06-08 NOTE — TELEPHONE ENCOUNTER
----- Message from CECI Wells sent at 6/7/2022  3:51 PM EDT -----  Please inform the patient that her lab work shows a possibility of celiac disease.  This is an allergy to gluten.  I do not recommend she make any changes to her diet yet as it can skew endoscopic examination.  Await EGD and colonoscopy results.

## 2022-06-09 ENCOUNTER — OFFICE VISIT (OUTPATIENT)
Dept: FAMILY MEDICINE CLINIC | Facility: CLINIC | Age: 52
End: 2022-06-09

## 2022-06-09 VITALS
HEART RATE: 88 BPM | BODY MASS INDEX: 26.84 KG/M2 | TEMPERATURE: 97.8 F | DIASTOLIC BLOOD PRESSURE: 72 MMHG | HEIGHT: 67 IN | WEIGHT: 171 LBS | SYSTOLIC BLOOD PRESSURE: 112 MMHG | OXYGEN SATURATION: 97 %

## 2022-06-09 DIAGNOSIS — J40 BRONCHITIS: ICD-10-CM

## 2022-06-09 DIAGNOSIS — R50.9 FEVER, UNSPECIFIED FEVER CAUSE: ICD-10-CM

## 2022-06-09 DIAGNOSIS — R05.9 COUGH: Primary | ICD-10-CM

## 2022-06-09 PROCEDURE — 99214 OFFICE O/P EST MOD 30 MIN: CPT | Performed by: INTERNAL MEDICINE

## 2022-06-09 RX ORDER — AMOXICILLIN 500 MG/1
1000 CAPSULE ORAL 3 TIMES DAILY
Qty: 21 CAPSULE | Refills: 0 | Status: SHIPPED | OUTPATIENT
Start: 2022-06-09 | End: 2023-02-08

## 2022-06-09 RX ORDER — PREDNISONE 10 MG/1
10 TABLET ORAL DAILY
Qty: 5 TABLET | Refills: 0 | Status: SHIPPED | OUTPATIENT
Start: 2022-06-09 | End: 2023-02-08

## 2022-06-09 RX ORDER — DEXTROMETHORPHAN HYDROBROMIDE AND PROMETHAZINE HYDROCHLORIDE 15; 6.25 MG/5ML; MG/5ML
5 SYRUP ORAL 4 TIMES DAILY PRN
Qty: 120 ML | Refills: 0 | Status: SHIPPED | OUTPATIENT
Start: 2022-06-09 | End: 2023-02-08

## 2022-06-09 NOTE — PROGRESS NOTES
Subjective Chief complaint is cough  Rosi Singleton is a 51 y.o. female.     History of Present Illness Rosi is here today for complaints of a cough.  Began approximately 5 or 6 days ago.  She had some fever and nasal congestion and drainage beginning over last weekend.  She began developing a cough on the sixth of this month.  She went to the emergency room but it was too crowded and she did not stay.  She reports since then that her cough is productive of yellow phlegm.  She is not any more short of breath than usual.  She is developing some fever blisters under her nose.    The following portions of the patient's history were reviewed and updated as appropriate: allergies, current medications, past family history, past medical history, past social history, past surgical history and problem list.    Review of Systems   Constitutional: Positive for chills and fever.   Respiratory: Positive for cough. Negative for chest tightness and shortness of breath.        Objective   Physical Exam  Vitals and nursing note reviewed.   Constitutional:       Appearance: Normal appearance.   HENT:      Right Ear: Tympanic membrane and ear canal normal.      Left Ear: Tympanic membrane and ear canal normal.      Nose: Congestion present.      Mouth/Throat:      Pharynx: No oropharyngeal exudate or posterior oropharyngeal erythema.   Cardiovascular:      Rate and Rhythm: Normal rate and regular rhythm.   Pulmonary:      Effort: Pulmonary effort is normal.      Breath sounds: Wheezing and rhonchi present. No rales.   Neurological:      Mental Status: She is alert.           Assessment & Plan   Diagnoses and all orders for this visit:    1. Cough (Primary)  -     COVID-19,[facility] ROUTINE, NP/OP SWAB IN TRANSPORT MEDIA OR ESWAB 72 HR TAT - Swab, Nasopharynx; Future  -     COVID-19,[facility] ROUTINE, NP/OP SWAB IN TRANSPORT MEDIA OR ESWAB 72 HR TAT - Swab, Nasopharynx    2. Fever, unspecified fever cause  -      COVID-19,[facility] ROUTINE, NP/OP SWAB IN TRANSPORT MEDIA OR ESWAB 72 HR TAT - Swab, Nasopharynx; Future  -     COVID-19,[facility] ROUTINE, NP/OP SWAB IN TRANSPORT MEDIA OR ESWAB 72 HR TAT - Swab, Nasopharynx    3. Bronchitis    Other orders  -     predniSONE (DELTASONE) 10 MG tablet; Take 1 tablet by mouth Daily.  Dispense: 5 tablet; Refill: 0  -     amoxicillin (AMOXIL) 500 MG capsule; Take 2 capsules by mouth 3 (Three) Times a Day.  Dispense: 21 capsule; Refill: 0  -     promethazine-dextromethorphan (PROMETHAZINE-DM) 6.25-15 MG/5ML syrup; Take 5 mL by mouth 4 (Four) Times a Day As Needed for Cough.  Dispense: 120 mL; Refill: 0    Patient is here today with complaints of cough.  I suspect this is going to be more of a bronchitis.  She does have some minor wheezing.  I am going to give her 10 mg of prednisone to take daily for 5 days.  I am going to give her some amoxicillin and some promethazine.  We will keep her off work until the 19th.

## 2022-06-10 ENCOUNTER — TELEPHONE (OUTPATIENT)
Dept: FAMILY MEDICINE CLINIC | Facility: CLINIC | Age: 52
End: 2022-06-10

## 2022-06-10 NOTE — TELEPHONE ENCOUNTER
Caller: DOUG    Relationship: Other Relative    Best call back number: [phone]    Caller requesting test results: DAUGHTER IN LAW    What test was performed: COVID    When was the test performed: 6/9    Where was the test performed: IN OFFICE    Additional notes:

## 2022-06-10 NOTE — TELEPHONE ENCOUNTER
Hub please inform patient    Voicemail not set up.  This was a send out test.  We do not have the results yet.

## 2022-06-11 LAB
LABCORP SARS-COV-2, NAA 2 DAY TAT: NORMAL
SARS-COV-2 RNA RESP QL NAA+PROBE: NOT DETECTED

## 2022-06-17 ENCOUNTER — HOSPITAL ENCOUNTER (OUTPATIENT)
Dept: CT IMAGING | Facility: HOSPITAL | Age: 52
Discharge: HOME | End: 2022-06-17
Admitting: INTERNAL MEDICINE

## 2022-06-17 DIAGNOSIS — R10.13 EPIGASTRIC PAIN: ICD-10-CM

## 2022-06-17 PROCEDURE — 74150 CT ABDOMEN W/O CONTRAST: CPT

## 2022-07-13 ENCOUNTER — HOSPITAL ENCOUNTER (OUTPATIENT)
Dept: MAMMOGRAPHY | Facility: HOSPITAL | Age: 52
Discharge: HOME | End: 2022-07-13
Admitting: INTERNAL MEDICINE

## 2022-07-13 DIAGNOSIS — Z12.31 SCREENING MAMMOGRAM, ENCOUNTER FOR: ICD-10-CM

## 2022-07-13 PROCEDURE — 77067 SCR MAMMO BI INCL CAD: CPT

## 2022-07-13 PROCEDURE — 77063 BREAST TOMOSYNTHESIS BI: CPT

## 2022-08-25 ENCOUNTER — TRANSCRIBE ORDERS (OUTPATIENT)
Dept: ADMINISTRATIVE | Facility: HOSPITAL | Age: 52
End: 2022-08-25

## 2022-08-25 DIAGNOSIS — Z01.818 OTHER SPECIFIED PRE-OPERATIVE EXAMINATION: Primary | ICD-10-CM

## 2022-08-27 ENCOUNTER — LAB (OUTPATIENT)
Dept: LAB | Facility: HOSPITAL | Age: 52
End: 2022-08-27

## 2022-08-27 DIAGNOSIS — Z01.818 OTHER SPECIFIED PRE-OPERATIVE EXAMINATION: ICD-10-CM

## 2022-08-27 LAB — SARS-COV-2 ORF1AB RESP QL NAA+PROBE: DETECTED

## 2022-08-27 PROCEDURE — U0004 COV-19 TEST NON-CDC HGH THRU: HCPCS

## 2022-08-27 PROCEDURE — C9803 HOPD COVID-19 SPEC COLLECT: HCPCS

## 2022-08-30 ENCOUNTER — TELEPHONE (OUTPATIENT)
Dept: FAMILY MEDICINE CLINIC | Facility: CLINIC | Age: 52
End: 2022-08-30

## 2022-08-30 NOTE — TELEPHONE ENCOUNTER
Caller: JUAN C SALAZAR    Relationship: Emergency Contact    Best call back number: [phone] (M)    What is the best time to reach you: ANYTIME, ASAP    Who are you requesting to speak with (clinical staff, provider,  specific staff member): CLINICAL STAFF    Do you know the name of the person who called: JUAN C SALAZAR, ON  [place]    What was the call regarding: PATIENT'S DAUGHTER IN LAW STATES THAT [facility] TOLD HER THAT [facility] HAS FAXED THE PAPERWORK FOR A RENEWAL OF FMLA CLAIM AND SHE WANTS TO KNOW IF THIS HAS BEEN RECEIVED AND IF IT CAN BE FAXED BACK ASAP, PLEASE ADVISE HER ASAP    Do you require a callback: YES, ASAP

## 2022-08-31 NOTE — TELEPHONE ENCOUNTER
Called daughter and notified her of what Dr. Rodriguez said that he has not received anything regarding this.  Advised her to call [employer] and have them refax the forms. Hub please inform patient

## 2022-09-02 ENCOUNTER — TELEPHONE (OUTPATIENT)
Dept: FAMILY MEDICINE CLINIC | Facility: CLINIC | Age: 52
End: 2022-09-02

## 2022-09-02 NOTE — TELEPHONE ENCOUNTER
Hub please inform patient I called pts daughter to inform her that her mothers FMLA is filled out. If FMLA fee is not paid it will be 20$ before we can send in mail or fax.

## 2022-09-02 NOTE — TELEPHONE ENCOUNTER
Caller: JUAN C SALAZAR    Relationship: Emergency Contact    Best call back number: [phone]    What is the best time to reach you: ANY    Who are you requesting to speak with (clinical staff, provider,  specific staff member): CLINICAL STAFF    What was the call regarding: HUB SHARED HUB TO READ MESSAGE AND JUAN C IS CONFUSED ABOUT WHY THERE IS AN FMLA FEE. SHE IS REQUESTING A CALL BACK TO DISCUSS WHEN AND WHERE TO MAKE THE PAYMENT    Do you require a callback: YES

## 2022-09-17 ENCOUNTER — APPOINTMENT (OUTPATIENT)
Dept: GENERAL RADIOLOGY | Facility: HOSPITAL | Age: 52
End: 2022-09-17

## 2022-09-17 ENCOUNTER — HOSPITAL ENCOUNTER (EMERGENCY)
Facility: HOSPITAL | Age: 52
Discharge: HOME | End: 2022-09-17
Attending: EMERGENCY MEDICINE | Admitting: EMERGENCY MEDICINE

## 2022-09-17 VITALS
HEIGHT: 63 IN | RESPIRATION RATE: 16 BRPM | SYSTOLIC BLOOD PRESSURE: 121 MMHG | DIASTOLIC BLOOD PRESSURE: 80 MMHG | TEMPERATURE: 99.5 F | BODY MASS INDEX: 30.65 KG/M2 | HEART RATE: 79 BPM | WEIGHT: 173 LBS | OXYGEN SATURATION: 97 %

## 2022-09-17 DIAGNOSIS — S93.602A FOOT SPRAIN, LEFT, INITIAL ENCOUNTER: ICD-10-CM

## 2022-09-17 DIAGNOSIS — S50.312A ABRASION OF LEFT ELBOW, INITIAL ENCOUNTER: ICD-10-CM

## 2022-09-17 DIAGNOSIS — S93.402A SPRAIN OF LEFT ANKLE, UNSPECIFIED LIGAMENT, INITIAL ENCOUNTER: Primary | ICD-10-CM
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
## 2022-09-17 PROCEDURE — 99283 EMERGENCY DEPT VISIT LOW MDM: CPT

## 2022-09-17 PROCEDURE — 73630 X-RAY EXAM OF FOOT: CPT

## 2022-09-17 PROCEDURE — 73610 X-RAY EXAM OF ANKLE: CPT

## 2022-09-17 RX ORDER — HYDROCODONE BITARTRATE AND ACETAMINOPHEN 7.5; 325 MG/1; MG/1
1 TABLET ORAL ONCE
Status: COMPLETED | OUTPATIENT
Start: 2022-09-17 | End: 2022-09-17

## 2022-09-17 RX ORDER — NAPROXEN 500 MG/1
500 TABLET ORAL 2 TIMES DAILY PRN
Qty: 20 TABLET | Refills: 0 | Status: SHIPPED | OUTPATIENT
Start: 2022-09-17

## 2022-09-17 RX ADMIN — HYDROCODONE BITARTRATE AND ACETAMINOPHEN 1 TABLET: 7.5; 325 TABLET ORAL at 18:46

## 2022-09-17 NOTE — DISCHARGE INSTRUCTIONS
Take medication as prescribed.  Wear boot as needed for the next 5 to 7 days.  Follow-up with Dr. Mehta if symptoms persist.  Return to the emergency department for worsening pain, swelling, numbness/tingling/weakness in your left leg, or other concern.

## 2022-09-17 NOTE — ED NOTES
"Pt to ED from home c/o left ankle injury aprox 1330 today, pt \"missed a step, fell and injured ankle\" no LOC, denies hitting head, denies blood thinners. Pt has 2+ pedal pulses, mild swelling noted, ankle elevated and ice applied.     PPE per protocol utilized throughout entire patient encounter.     "

## 2022-09-17 NOTE — ED PROVIDER NOTES
EMERGENCY DEPARTMENT ENCOUNTER    Room Number:  HE1/I  Date of encounter:  9/17/2022  PCP: Floyd Rodriguez MD  Historian: Patient, family member (acting as )    I used full protective equipment while examining this patient.  This includes face mask, gloves and protective eyewear.  I washed my hands before entering the room and immediately upon leaving the room.  Patient was wearing a surgical mask.      HPI:  Chief Complaint: Left ankle injury  A complete HPI/ROS/PMH/PSH/SH/FH are unobtainable due to: None    Context: Rosi Singleton is a 51 y.o. female who presents to the ED c/o left ankle injury.  Patient was walking outside several hours ago when she missed a step and twisted her left ankle.  She complains of pain in the left lateral ankle and left foot.  Pain is currently 9/10.  Pain is worse with movement and walking.  Patient also complains of an abrasion on her left elbow and right hand.  She did not hit her head.  Denies loss of consciousness, headache, neck pain, back pain, chest pain, abdominal pain, shortness of breath, or numbness/tingling/weakness in her extremities.      PAST MEDICAL HISTORY  Active Ambulatory Problems     Diagnosis Date Noted   • Arthritis 10/14/2020   • Essential (primary) hypertension 10/14/2020   • Occipital headache 10/14/2020   • Gastroesophageal reflux disease without esophagitis 10/14/2020   • Dupuytren's contracture of both hands 10/14/2020   • Hyperglycemia 10/14/2020   • Elevated transaminase level 10/14/2020   • Impaired glucose tolerance 12/17/2020   • Encounter for screening for malignant neoplasm of colon 06/01/2022   • Epigastric pain 06/01/2022   • Nausea and vomiting 06/01/2022   • Heartburn 06/01/2022     Resolved Ambulatory Problems     Diagnosis Date Noted   • Sore throat 10/14/2020     Past Medical History:   Diagnosis Date   • Hypertension          PAST SURGICAL HISTORY  No past surgical history on file.      FAMILY HISTORY  No family  history on file.      SOCIAL HISTORY  Social History     Socioeconomic History   • Marital status:    Tobacco Use   • Smoking status: Never Smoker   • Smokeless tobacco: Never Used   Substance and Sexual Activity   • Alcohol use: No   • Drug use: No   • Sexual activity: Defer         ALLERGIES  Patient has no known allergies.       REVIEW OF SYSTEMS  Review of Systems        All systems have been reviewed and are negative except as as discussed in the HPI    PHYSICAL EXAM    I have reviewed the triage vital signs and nursing notes.    ED Triage Vitals [09/17/22 1817]   Temp Heart Rate Resp BP SpO2   99.5 °F (37.5 °C) 93 16 -- 97 %      Temp src Heart Rate Source Patient Position BP Location FiO2 (%)   -- -- -- -- --       Physical Exam  GENERAL: Awake, alert, oriented x3.  Well-developed, well-nourished female.  Resting comfortably in no acute distress  HENT: NCAT, nares patent, moist mucous membranes  NECK: C-spine is nontender  EYES:  no scleral icterus  CV: regular rhythm, regular rate, normal left pedal pulses  RESPIRATORY: normal effort, clear to auscultation bilaterally  ABDOMEN: soft, nontender  MUSCULOSKELETAL: There is tenderness over the lateral left ankle and left midfoot.  No obvious deformities.  Remainder of the left leg, right leg, and both upper extremities are nontender.  Chest, T-spine, and L-spine are nontender  NEURO: Speech is normal.  No facial droop.  Follows commands.  SKIN: There is a small abrasion on the right hand and the left posterior elbow.  PSYCH: Normal mood and affect      LAB RESULTS  No results found for this or any previous visit (from the past 24 hour(s)).    Ordered the above labs and independently reviewed the results.      RADIOLOGY  XR Ankle 3+ View Left, XR Foot 3+ View Left    Result Date: 9/17/2022  THREE EMERGENCY VIEWS OF THE LEFT ANKLE AND THREE VIEWS OF THE LEFT FOOT 09/17/2022  CLINICAL HISTORY: Patient had foot and ankle injury with pain in the lateral  aspect of the left ankle and foot.  LEFT ANKLE: 3 portable views including portable AP, oblique and lateral views left ankle are submitted for interpretation. I see no radiographic evidence of acute fracture or malalignment or acute osseous abnormality in the bones of the left ankle. On the lateral view there is evidence of a small plantar calcaneal heel spur.  LEFT FOOT: 3 views including AP, oblique and lateral views of the left foot are submitted for interpretation. On the AP and oblique views the toes are slightly bent which limits evaluation of the phalanges of the left foot. I see no discernible acute fracture or malalignment or acute osseous abnormality in the bones of the left foot. There is some degenerative changes in the left first and second metatarsal phalangeal joints with mild marginal spurring.        I ordered the above noted radiological studies. Reviewed by me and discussed with radiologist.  See dictation for official radiology interpretation.      PROCEDURES  Procedures      MEDICATIONS GIVEN IN ER    Medications   HYDROcodone-acetaminophen (NORCO) 7.5-325 MG per tablet 1 tablet (1 tablet Oral Given 9/17/22 1846)         PROGRESS, DATA ANALYSIS, CONSULTS, AND MEDICAL DECISION MAKING    All labs have been independently reviewed by me.  All radiology studies have been reviewed by me and discussed with radiologist dictating the report.   EKG's independently viewed and interpreted by me.  I have reviewed the nurse's notes, vital signs, past medical history, and medication list.  Discussion below represents my analysis of pertinent findings related to patient's condition, differential diagnosis, treatment plan and final disposition.      ED Course as of 09/17/22 2226   Sat Sep 17, 2022   1913 X-rays of the left foot and ankle are negative acute. [WH]   1917 X-ray results discussed with the patient and her family.  Patient will be placed in a walking boot and referred to orthopedics for follow-up.   Return precautions were discussed. [WH]      ED Course User Index  [WH] Dany Cabral MD       AS OF 22:26 EDT VITALS:    BP - 121/80  HR - 79  TEMP - 99.5 °F (37.5 °C)  O2 SATS - 97%      DIAGNOSIS  Final diagnoses:   Sprain of left ankle, unspecified ligament, initial encounter   Foot sprain, left, initial encounter   Abrasion of left elbow, initial encounter   Fall, initial encounter         DISPOSITION  DISCHARGE    Patient discharged in stable condition.    Reviewed implications of results, diagnosis, meds, responsibility to follow up, warning signs and symptoms of possible worsening, potential complications and reasons to return to ER, including worsening pain, swelling, numbness/tingling in your extremities, or other concern..    Patient/Family voiced understanding of above instructions.    Discussed plan for discharge, as there is no emergent indication for admission. Patient referred to primary care provider for BP management due to today's BP. Pt/family is agreeable and understands need for follow up and repeat testing.  Pt is aware that discharge does not mean that nothing is wrong but it indicates no emergency is present that requires admission and they must continue care with follow-up as given below or physician of their choice.     FOLLOW-UP  Ye Mehta MD  [address] [facility] [address]  [place] [id number] [phone]    Schedule an appointment as soon as possible for a visit   Left ankle sprain, If symptoms persist         Medication List      New Prescriptions    naproxen 500 MG EC tablet  Commonly known as: EC NAPROSYN  Take 1 tablet by mouth 2 (Two) Times a Day As Needed for Mild Pain.           Where to Get Your Medications      These medications were sent to [id number] - [place], [place] - [address]  AT [facility] - [phone]  - [phone] FX  [address] , [facility] [address]    Phone: [phone]   · naproxen 500 MG EC  tablet           Dictated utilizing Dragon Dany Hobbs MD  09/17/22 7083

## 2022-09-19 ENCOUNTER — TELEPHONE (OUTPATIENT)
Dept: FAMILY MEDICINE CLINIC | Facility: CLINIC | Age: 52
End: 2022-09-19

## 2022-09-19 NOTE — TELEPHONE ENCOUNTER
PT FELL ON SATURDAY WENT TO THE ER AND THEY PUT HER ANKLE IN A BOOT.  THEY TOOK HER OFF WORK UNTIL TOMORROW.  THEY ARE ASKING FOR A WORK NOTE UNTIL SHE SEE'S THE ORTHOPEDIC DR ON [address]  PT'S #[phone] DAUGHTER- IN-LAW  JUAN C

## 2022-09-19 NOTE — TELEPHONE ENCOUNTER
PT FELL ON SATURDAY WENT TO THE ER AND THEY PUT HER ANKLE IN A BOOT.  THEY TOOK HER OFF WORK UNTIL TOMORROW.  THEY ARE ASKING FOR A WORK NOTE UNTIL SHE SEE'S THE ORTHOPEDIC DR ON [address]  PT'S #[phone] DAUGHTER- IN-LAW  JUAN C

## 2022-11-14 ENCOUNTER — TELEPHONE (OUTPATIENT)
Dept: GASTROENTEROLOGY | Facility: CLINIC | Age: 52
End: 2022-11-14

## 2022-11-14 NOTE — TELEPHONE ENCOUNTER
Hub staff attempted to follow warm transfer process and was unsuccessful     Caller: JUAN C SALAZAR    Relationship to patient: DAUGHTER    Best call back number: [phone]    Patient is needing: TO RESCHEDULE 8/30 COLONOSCOPY THAT WAS CANCELLED DUE TO POSITIVE COVID TEST 72 HOURS BEFORE. PT HAS NOT BEEN CONTACTED TO RESCHEDULE.

## 2022-12-01 NOTE — TELEPHONE ENCOUNTER
Caller: JUAN C SALAZAR    Relationship to patient: Emergency Contact - DAUGHTER IN LAW    Best call back number: [phone]    Patient is needing: DAUGHTER IN LAW CALLED TO RESCHEDULE COLONOSCOPY.     PT WILL NEED TO KNOW IF SHE WILL NEED A COVID TEST BEFORE PROCEDURE .          PLEASE CALL TO SCHEDULE AND ADVISE.     PT PHONE NUMBER [phone] - WILL NEED .

## 2022-12-22 ENCOUNTER — TELEPHONE (OUTPATIENT)
Dept: GASTROENTEROLOGY | Facility: CLINIC | Age: 52
End: 2022-12-22

## 2022-12-22 NOTE — TELEPHONE ENCOUNTER
Caller: JUAN C SALAZAR    Relationship to patient: Emergency Contact    Best call back number: [phone]    Type of visit: COLONOSCOPY    Requested date: ASAP    If rescheduling, when is the original appointment: 8/30/22    Additional notes:PT'S DAUGHTER CALLED TO RESCHEDULE COLONOSCOPY. DAUGHTER STATED SHE HAD CALLED A FEW TIMES AND HAS NOT HEARD BACK. CALL BACK ANYTIME. OK TO M

## 2023-01-12 ENCOUNTER — PREP FOR SURGERY (OUTPATIENT)
Dept: OTHER | Facility: HOSPITAL | Age: 53
End: 2023-01-12
Payer: COMMERCIAL

## 2023-01-12 DIAGNOSIS — R10.13 EPIGASTRIC PAIN: Primary | ICD-10-CM

## 2023-01-12 DIAGNOSIS — Z12.11 ENCOUNTER FOR SCREENING FOR MALIGNANT NEOPLASM OF COLON: ICD-10-CM

## 2023-02-08 ENCOUNTER — OFFICE VISIT (OUTPATIENT)
Dept: FAMILY MEDICINE CLINIC | Facility: CLINIC | Age: 53
End: 2023-02-08
Payer: COMMERCIAL

## 2023-02-08 VITALS
BODY MASS INDEX: 31.36 KG/M2 | WEIGHT: 177 LBS | OXYGEN SATURATION: 97 % | HEART RATE: 73 BPM | DIASTOLIC BLOOD PRESSURE: 90 MMHG | SYSTOLIC BLOOD PRESSURE: 144 MMHG | HEIGHT: 63 IN

## 2023-02-08 DIAGNOSIS — I10 ESSENTIAL (PRIMARY) HYPERTENSION: Primary | ICD-10-CM

## 2023-02-08 DIAGNOSIS — J40 BRONCHITIS: ICD-10-CM

## 2023-02-08 PROCEDURE — 99214 OFFICE O/P EST MOD 30 MIN: CPT | Performed by: INTERNAL MEDICINE

## 2023-02-08 RX ORDER — DEXTROMETHORPHAN HYDROBROMIDE AND PROMETHAZINE HYDROCHLORIDE 15; 6.25 MG/5ML; MG/5ML
5 SYRUP ORAL 4 TIMES DAILY PRN
Qty: 120 ML | Refills: 0 | Status: SHIPPED | OUTPATIENT
Start: 2023-02-08

## 2023-02-08 RX ORDER — VALSARTAN 80 MG/1
80 TABLET ORAL DAILY
Qty: 30 TABLET | Refills: 5 | Status: SHIPPED | OUTPATIENT
Start: 2023-02-08

## 2023-02-08 NOTE — PROGRESS NOTES
Subjective Chief complaint is follow-up on blood pressure but also cough  Rosi Singleton is a 52 y.o. female.     History of Present Illness  Rosi is here today for follow-up on her blood pressure.  I did retake it at 136/96.  She has tried amlodipine and had side effects from that.  We tried metoprolol and she had side effects from that.  We tried valsartan with some hydrochlorothiazide and she had side effects with that.  I suspect that she is not giving this in a good enough trial.  I am going to try the valsartan without the hydrochlorothiazide.  I did explain that it is usually very low in side effects.  She really needs to give it a good amount of time.  I am going to see her back in a month.  She has had some upper respiratory symptoms in terms of a cough.  This sounds like it may be associated with some laryngeal spasm.  She may be producing some yellow phlegm.    The following portions of the patient's history were reviewed and updated as appropriate: allergies, current medications, past family history, past medical history, past social history, past surgical history and problem list.    Review of Systems   Constitutional: Negative for chills and fever.   HENT: Positive for sore throat.    Respiratory: Positive for cough.        Objective   Physical Exam  Vitals and nursing note reviewed.   HENT:      Nose: Congestion present.      Comments: There is bilateral congestion of the turbinates but no significant erythema.     Mouth/Throat:      Pharynx: No oropharyngeal exudate or posterior oropharyngeal erythema.   Cardiovascular:      Rate and Rhythm: Normal rate and regular rhythm.   Pulmonary:      Effort: Pulmonary effort is normal.      Breath sounds: No wheezing, rhonchi or rales.           Assessment & Plan   Diagnoses and all orders for this visit:    1. Essential (primary) hypertension (Primary)    2. Bronchitis    Other orders  -     valsartan (Diovan) 80 MG tablet; Take 1 tablet by mouth  Daily.  Dispense: 30 tablet; Refill: 5  -     promethazine-dextromethorphan (PROMETHAZINE-DM) 6.25-15 MG/5ML syrup; Take 5 mL by mouth 4 (Four) Times a Day As Needed for Cough.  Dispense: 120 mL; Refill: 0    Rosi is here today for follow-up.  I am going to try just plain valsartan without hydrochlorothiazide.  I am going to have her use some Promethazine DM for the cough.  Her lungs are clear and her oxygen level looks good.  I do not think any antibiotics will be necessary.  However if her cough persists we can try a little bit of something like doxycycline.

## 2023-02-11 ENCOUNTER — PREP FOR SURGERY (OUTPATIENT)
Dept: OTHER | Facility: HOSPITAL | Age: 53
End: 2023-02-11
Payer: COMMERCIAL

## 2023-02-11 DIAGNOSIS — Z12.11 ENCOUNTER FOR SCREENING FOR MALIGNANT NEOPLASM OF COLON: ICD-10-CM

## 2023-02-11 DIAGNOSIS — R10.13 EPIGASTRIC PAIN: Primary | ICD-10-CM

## 2023-03-24 ENCOUNTER — PREP FOR SURGERY (OUTPATIENT)
Dept: OTHER | Facility: HOSPITAL | Age: 53
End: 2023-03-24
Payer: COMMERCIAL

## 2023-03-24 ENCOUNTER — OFFICE VISIT (OUTPATIENT)
Dept: FAMILY MEDICINE CLINIC | Facility: CLINIC | Age: 53
End: 2023-03-24
Payer: COMMERCIAL

## 2023-03-24 VITALS
WEIGHT: 176 LBS | SYSTOLIC BLOOD PRESSURE: 132 MMHG | BODY MASS INDEX: 31.18 KG/M2 | HEART RATE: 78 BPM | HEIGHT: 63 IN | OXYGEN SATURATION: 96 % | DIASTOLIC BLOOD PRESSURE: 90 MMHG

## 2023-03-24 DIAGNOSIS — I10 ESSENTIAL (PRIMARY) HYPERTENSION: Primary | ICD-10-CM

## 2023-03-24 DIAGNOSIS — Z12.11 ENCOUNTER FOR SCREENING FOR MALIGNANT NEOPLASM OF COLON: Primary | ICD-10-CM

## 2023-03-24 DIAGNOSIS — R11.14 BILIOUS VOMITING WITH NAUSEA: ICD-10-CM

## 2023-03-24 PROCEDURE — 99214 OFFICE O/P EST MOD 30 MIN: CPT | Performed by: INTERNAL MEDICINE

## 2023-03-24 RX ORDER — ONDANSETRON 4 MG/1
4 TABLET, FILM COATED ORAL DAILY
Qty: 30 TABLET | Refills: 1 | Status: SHIPPED | OUTPATIENT
Start: 2023-03-24

## 2023-03-24 NOTE — PROGRESS NOTES
Subjective Chief complaint is follow-up on blood pressure but also vomiting  Rosi Singleton is a 52 y.o. female.     History of Present Illness Rosi is here today for follow-up on her blood pressure.  I took it in her left arm and got 180/110.  In her right arm I got 170/106.  In speaking with her daughter she is only taking the valsartan intermittently.  When she feels sick to her stomach she does not take it.  She has not had her EGD or colonoscopy.  I did advise that she really needs to take the valsartan every day.  I am going to give her some Zofran to take once in the morning along with it to see if that will help her get it down and help it stay down.  She has tried several other medications.  She seems to have side effects with all of them.  The following portions of the patient's history were reviewed and updated as appropriate: allergies, current medications, past family history, past medical history, past social history, past surgical history and problem list.    Review of Systems    Objective   Physical Exam  Constitutional:       Appearance: She is obese.   Cardiovascular:      Rate and Rhythm: Normal rate.   Pulmonary:      Effort: Pulmonary effort is normal.   Neurological:      Mental Status: She is alert.           Assessment & Plan   Diagnoses and all orders for this visit:    1. Essential (primary) hypertension (Primary)    2. Bilious vomiting with nausea    Other orders  -     ondansetron (ZOFRAN) 4 MG tablet; Take 1 tablet by mouth Daily. Take with valsartan  Dispense: 30 tablet; Refill: 1    Rosi is here today for follow-up.  Her blood pressure is significantly high to my exam.  I am going to have her really try to focus on taking the valsartan every day.  I am going to give her some ondansetron to take in the mornings to see if it will help her keep the medicine down.

## 2023-03-29 ENCOUNTER — TELEPHONE (OUTPATIENT)
Dept: GASTROENTEROLOGY | Facility: CLINIC | Age: 53
End: 2023-03-29
Payer: COMMERCIAL

## 2023-03-29 ENCOUNTER — TELEPHONE (OUTPATIENT)
Dept: GASTROENTEROLOGY | Facility: CLINIC | Age: 53
End: 2023-03-29

## 2023-03-29 NOTE — TELEPHONE ENCOUNTER
Hub staff attempted to follow warm transfer process and was unsuccessful     Caller: JUAN C SALAZAR    Relationship to patient: Emergency Contact    Best call back number: [phone]    Patient is needing: PATIENT'S EC IS CALLING TO SCHEDULE EGD. PLEASE CALL BACK.

## 2023-04-04 NOTE — TELEPHONE ENCOUNTER
Hub staff attempted to follow warm transfer process and was unsuccessful     Caller: JUAN C SALAZAR    Relationship to patient: Emergency Contact    Best call back number: [phone]    Patient is needing: TO SCHEDULE SCOPE. PLEASE CALL PATIENT'S DAUGHTER IN LAW TO SCHEDULE ASAP.

## 2023-04-05 ENCOUNTER — TELEPHONE (OUTPATIENT)
Dept: FAMILY MEDICINE CLINIC | Facility: CLINIC | Age: 53
End: 2023-04-05

## 2023-04-05 NOTE — TELEPHONE ENCOUNTER
Caller: JUAN C SALAZAR     Relationship: [unfilled]     Best call back number: [phone]    What is your medical concern? PATIENT'S DAUGHTER HAS BEEN TRYING TO GET PATIENT SCHEDULED FOR HER COLONOSCOPY. EVERY TIME DAUGHTER CALLS SHE IS TOLD THAT SOMEONE WILL CALL HER BACK IN 48 HOURS. SHE HAS NOT GOTTEN A CALL BACK AND HAS BEEN TOLD THIS 3 TIMES NOW. WOULD LIKE TO GET HELP GETTING THIS SCHEDULED.

## 2023-04-07 ENCOUNTER — TELEPHONE (OUTPATIENT)
Dept: GASTROENTEROLOGY | Facility: CLINIC | Age: 53
End: 2023-04-07

## 2023-04-07 ENCOUNTER — TELEPHONE (OUTPATIENT)
Dept: GASTROENTEROLOGY | Facility: CLINIC | Age: 53
End: 2023-04-07
Payer: COMMERCIAL

## 2023-04-07 NOTE — TELEPHONE ENCOUNTER
Caller: JUAN C SALAZAR    Relationship: Emergency Contact    Best call back number: [phone]    What is the best time to reach you: ANYTIME ON 04/07/23 PREFERRED    Who are you requesting to speak with (clinical staff, provider,  specific staff member): CLINICAL STAFF    Do you know the name of the person who called: BERKLEY LINDER    What was the call regarding: RETURNING A MISSED CALL TO SCHEDULE A COLONOSCOPY    Do you require a callback: YES

## 2023-04-11 ENCOUNTER — TELEPHONE (OUTPATIENT)
Dept: GASTROENTEROLOGY | Facility: CLINIC | Age: 53
End: 2023-04-11

## 2023-04-17 ENCOUNTER — TELEPHONE (OUTPATIENT)
Dept: GASTROENTEROLOGY | Facility: CLINIC | Age: 53
End: 2023-04-17
Payer: COMMERCIAL

## 2023-04-17 NOTE — TELEPHONE ENCOUNTER
OK FOR HUB TO READ  HERLINDA patient via telephone for EGD AND COLONOSCOPY. Scheduled 8/123 with arrival time of 1215. Prep paperwork mailed to verified address on file. Patient advised arrival time may change based on [facility] guidelines. HERLINDA [facility]

## 2023-05-01 ENCOUNTER — TELEPHONE (OUTPATIENT)
Dept: FAMILY MEDICINE CLINIC | Facility: CLINIC | Age: 53
End: 2023-05-01
Payer: COMMERCIAL

## 2023-05-01 NOTE — TELEPHONE ENCOUNTER
Caller: JUAN C SALAZAR    Relationship: Emergency Contact    Best call back number: [phone]    What is the best time to reach you: ANYTIME    Who are you requesting to speak with (clinical staff, provider,  specific staff member): DR. SANDHU    What was the call regarding: PATIENTS DAUGHTER IN LAW STATES SHE IS WANTING TO KNOW IF DR. SANDHU RECEIVED [place] PAPERWORK FOR THE PATIENT OR NOT.    PATIENTS DAUGHTER IN LAW STATES THE PATIENTS CURRENT FMLA IS EXPIRING AND THEY ARE NEEDING UPDATED FORMS FILLED OUT.     PATIENTS DAUGHTER IN LAW IS REQUESTING A CALL BACK TO LET HER KNOW.     I advised that I have not received the paperwork yet.  She can bring by another copy.

## 2023-05-04 ENCOUNTER — TELEPHONE (OUTPATIENT)
Dept: FAMILY MEDICINE CLINIC | Facility: CLINIC | Age: 53
End: 2023-05-04

## 2023-05-04 NOTE — TELEPHONE ENCOUNTER
Caller: JUAN C SALAZAR    Relationship to patient: Emergency Contact    Best call back number: [phone]    Patient is needing: [facility] PAPERWORK WAS DROPPED OFF TO THE OFFICE ON 4/28/23, SHE NEEDS TO KNOW IF IT HAS BEEN COMPLETED AND SENT BACK. PLEASE REACH OUT AND ADVISE.

## 2023-05-08 ENCOUNTER — TELEPHONE (OUTPATIENT)
Dept: FAMILY MEDICINE CLINIC | Facility: CLINIC | Age: 53
End: 2023-05-08
Payer: COMMERCIAL

## 2023-05-09 ENCOUNTER — TELEPHONE (OUTPATIENT)
Dept: FAMILY MEDICINE CLINIC | Facility: CLINIC | Age: 53
End: 2023-05-09
Payer: COMMERCIAL

## 2023-05-09 NOTE — TELEPHONE ENCOUNTER
Hub to read. Called pt daughter to advise [place] paper work is ready for  and requires a $20.00 payment.

## 2023-07-24 ENCOUNTER — TELEPHONE (OUTPATIENT)
Dept: GASTROENTEROLOGY | Facility: CLINIC | Age: 53
End: 2023-07-24
Payer: COMMERCIAL

## 2023-08-01 ENCOUNTER — ANESTHESIA EVENT (OUTPATIENT)
Dept: GASTROENTEROLOGY | Facility: HOSPITAL | Age: 53
End: 2023-08-01
Payer: COMMERCIAL

## 2023-08-01 ENCOUNTER — ANESTHESIA (OUTPATIENT)
Dept: GASTROENTEROLOGY | Facility: HOSPITAL | Age: 53
End: 2023-08-01
Payer: COMMERCIAL

## 2023-08-01 ENCOUNTER — HOSPITAL ENCOUNTER (OUTPATIENT)
Facility: HOSPITAL | Age: 53
Setting detail: HOSPITAL OUTPATIENT SURGERY
Discharge: HOME | End: 2023-08-01
Attending: INTERNAL MEDICINE | Admitting: INTERNAL MEDICINE
Payer: COMMERCIAL

## 2023-08-01 VITALS
BODY MASS INDEX: 29.95 KG/M2 | WEIGHT: 169 LBS | RESPIRATION RATE: 16 BRPM | DIASTOLIC BLOOD PRESSURE: 95 MMHG | OXYGEN SATURATION: 91 % | HEIGHT: 63 IN | HEART RATE: 77 BPM | SYSTOLIC BLOOD PRESSURE: 128 MMHG | TEMPERATURE: 97.8 F

## 2023-08-01 DIAGNOSIS — R11.14 BILIOUS VOMITING WITH NAUSEA: ICD-10-CM

## 2023-08-01 DIAGNOSIS — Z12.11 ENCOUNTER FOR SCREENING FOR MALIGNANT NEOPLASM OF COLON: ICD-10-CM

## 2023-08-01 PROCEDURE — 25010000002 PROPOFOL 10 MG/ML EMULSION: Performed by: NURSE ANESTHETIST, CERTIFIED REGISTERED

## 2023-08-01 PROCEDURE — S0260 H&P FOR SURGERY: HCPCS | Performed by: INTERNAL MEDICINE

## 2023-08-01 PROCEDURE — 88305 TISSUE EXAM BY PATHOLOGIST: CPT | Performed by: INTERNAL MEDICINE

## 2023-08-01 PROCEDURE — 43239 EGD BIOPSY SINGLE/MULTIPLE: CPT | Performed by: INTERNAL MEDICINE

## 2023-08-01 PROCEDURE — 45380 COLONOSCOPY AND BIOPSY: CPT | Performed by: INTERNAL MEDICINE

## 2023-08-01 RX ORDER — LIDOCAINE HYDROCHLORIDE 20 MG/ML
INJECTION, SOLUTION INFILTRATION; PERINEURAL AS NEEDED
Status: DISCONTINUED | OUTPATIENT
Start: 2023-08-01 | End: 2023-08-01 | Stop reason: SURG

## 2023-08-01 RX ORDER — PROPOFOL 10 MG/ML
VIAL (ML) INTRAVENOUS CONTINUOUS PRN
Status: DISCONTINUED | OUTPATIENT
Start: 2023-08-01 | End: 2023-08-01 | Stop reason: SURG

## 2023-08-01 RX ORDER — SODIUM CHLORIDE 0.9 % (FLUSH) 0.9 %
10 SYRINGE (ML) INJECTION AS NEEDED
Status: DISCONTINUED | OUTPATIENT
Start: 2023-08-01 | End: 2023-08-01 | Stop reason: HOSPADM

## 2023-08-01 RX ORDER — SODIUM CHLORIDE, SODIUM LACTATE, POTASSIUM CHLORIDE, CALCIUM CHLORIDE 600; 310; 30; 20 MG/100ML; MG/100ML; MG/100ML; MG/100ML
1000 INJECTION, SOLUTION INTRAVENOUS CONTINUOUS
Status: DISCONTINUED | OUTPATIENT
Start: 2023-08-01 | End: 2023-08-01 | Stop reason: HOSPADM

## 2023-08-01 RX ORDER — PROPOFOL 10 MG/ML
VIAL (ML) INTRAVENOUS AS NEEDED
Status: DISCONTINUED | OUTPATIENT
Start: 2023-08-01 | End: 2023-08-01 | Stop reason: SURG

## 2023-08-01 RX ORDER — LIDOCAINE HYDROCHLORIDE 10 MG/ML
0.5 INJECTION, SOLUTION INFILTRATION; PERINEURAL ONCE AS NEEDED
Status: DISCONTINUED | OUTPATIENT
Start: 2023-08-01 | End: 2023-08-01 | Stop reason: HOSPADM

## 2023-08-01 RX ADMIN — SODIUM CHLORIDE, POTASSIUM CHLORIDE, SODIUM LACTATE AND CALCIUM CHLORIDE 1000 ML: 600; 310; 30; 20 INJECTION, SOLUTION INTRAVENOUS at 12:24

## 2023-08-01 RX ADMIN — LIDOCAINE HYDROCHLORIDE 100 MG: 20 INJECTION, SOLUTION INFILTRATION; PERINEURAL at 12:29

## 2023-08-01 RX ADMIN — PROPOFOL 50 MG: 10 INJECTION, EMULSION INTRAVENOUS at 12:37

## 2023-08-01 RX ADMIN — Medication 300 MCG/KG/MIN: at 12:29

## 2023-08-01 RX ADMIN — PROPOFOL 100 MG: 10 INJECTION, EMULSION INTRAVENOUS at 12:29

## 2023-08-02 LAB
LAB AP CASE REPORT: NORMAL
PATH REPORT.FINAL DX SPEC: NORMAL
PATH REPORT.GROSS SPEC: NORMAL

## 2023-08-08 ENCOUNTER — TELEPHONE (OUTPATIENT)
Dept: FAMILY MEDICINE CLINIC | Facility: CLINIC | Age: 53
End: 2023-08-08
Payer: COMMERCIAL

## 2023-08-11 ENCOUNTER — TELEPHONE (OUTPATIENT)
Dept: GASTROENTEROLOGY | Facility: CLINIC | Age: 53
End: 2023-08-11
Payer: COMMERCIAL

## 2023-08-11 NOTE — TELEPHONE ENCOUNTER
----- Message from Pablo Lema MD sent at 8/7/2023  3:41 PM EDT -----  Tubular adenoma colon polyp  Colonoscopy recall 5 years  No H. pylori celiac or microscopic colitis

## 2023-08-15 ENCOUNTER — TELEPHONE (OUTPATIENT)
Dept: FAMILY MEDICINE CLINIC | Facility: CLINIC | Age: 53
End: 2023-08-15

## 2023-08-15 ENCOUNTER — TELEPHONE (OUTPATIENT)
Dept: GASTROENTEROLOGY | Facility: CLINIC | Age: 53
End: 2023-08-15
Payer: COMMERCIAL

## 2023-08-15 NOTE — TELEPHONE ENCOUNTER
Caller: JUAN C SALAZAR    Relationship: Emergency Contact    Best call back number: [phone]     Do you know the name of the person who called: NO    What was the call regarding: PT'S DAUGHTER  RETURNING PHONE CALL. PLEASE CALL DAUGHTER BACK

## 2023-08-15 NOTE — TELEPHONE ENCOUNTER
Patient called no answer. Daughter in law  Vonnie called back. Advised as per Dr. Lema's note. She verb understanding.   Repeat colonoscopy in 5 yrs added to recall. 08/01/28.

## 2023-08-15 NOTE — TELEPHONE ENCOUNTER
Caller: JUAN C SALAZAR    Relationship: Emergency Contact    Best call back number: [phone]     What was the call regarding: REQUESTING AN UPDATE ON [facility] PAPERWORK THAT WAS DROPPED OFF.

## 2023-10-11 ENCOUNTER — OFFICE VISIT (OUTPATIENT)
Dept: FAMILY MEDICINE CLINIC | Facility: CLINIC | Age: 53
End: 2023-10-11
Payer: COMMERCIAL

## 2023-10-11 VITALS
WEIGHT: 179 LBS | DIASTOLIC BLOOD PRESSURE: 90 MMHG | OXYGEN SATURATION: 96 % | BODY MASS INDEX: 31.71 KG/M2 | RESPIRATION RATE: 16 BRPM | HEART RATE: 75 BPM | SYSTOLIC BLOOD PRESSURE: 132 MMHG | HEIGHT: 63 IN

## 2023-10-11 DIAGNOSIS — R07.89 ATYPICAL CHEST PAIN: ICD-10-CM

## 2023-10-11 DIAGNOSIS — Z12.31 ENCOUNTER FOR SCREENING MAMMOGRAM FOR MALIGNANT NEOPLASM OF BREAST: ICD-10-CM

## 2023-10-11 DIAGNOSIS — I10 ESSENTIAL (PRIMARY) HYPERTENSION: Primary | ICD-10-CM

## 2023-10-11 PROCEDURE — 99214 OFFICE O/P EST MOD 30 MIN: CPT | Performed by: INTERNAL MEDICINE

## 2023-10-11 RX ORDER — VALSARTAN 160 MG/1
160 TABLET ORAL DAILY
Qty: 30 TABLET | Refills: 5 | Status: SHIPPED | OUTPATIENT
Start: 2023-10-11

## 2023-10-11 NOTE — PROGRESS NOTES
Subjective chief complaint is elevated blood pressure  Rosi Singleton is a 53 y.o. female.     History of Present Illness Rosi is here today for blood pressure elevations.  She reportedly awakens in the night with a headache.  She then gets up and takes her blood pressure.  It has been as high as 170 over possibly 110.  She is on 80 mg of valsartan.  She has been intolerant of a number of other medications.  She had an MRI scan of the brain not too long ago that showed no intracranial reason for her headaches.    The following portions of the patient's history were reviewed and updated as appropriate: allergies, current medications, and problem list.    Review of Systems   Cardiovascular:         She did have some chest discomfort after her EGD.  She is not having it today.   Neurological:  Positive for headache.     Objective   Physical Exam  Vitals and nursing note reviewed.   Constitutional:       Appearance: Normal appearance.   Cardiovascular:      Rate and Rhythm: Normal rate and regular rhythm.      Pulses: Normal pulses.   Pulmonary:      Effort: Pulmonary effort is normal.      Breath sounds: No wheezing, rhonchi or rales.   Neurological:      General: No focal deficit present.      Mental Status: She is alert.      Cranial Nerves: No cranial nerve deficit.       Assessment & Plan   Diagnoses and all orders for this visit:    1. Essential (primary) hypertension (Primary)    2. Atypical chest pain    3. Encounter for screening mammogram for malignant neoplasm of breast  -     Mammo Screening Digital Tomosynthesis Bilateral With CAD; Future    Other orders  -     valsartan (Diovan) 160 MG tablet; Take 1 tablet by mouth Daily.  Dispense: 30 tablet; Refill: 5    Rosi is here today for follow-up.  Her blood pressure to my exam was 130/88.  We did discuss increasing her valsartan to 160 mg.  She can take 2 of her 80 mg tablets.  She had some chest pain after her EGD which may have been due to  insufflation or the gastric biopsies.  She is not having chest pain today.  She is due for a mammogram and we will go ahead and order that.

## 2023-10-23 ENCOUNTER — TELEPHONE (OUTPATIENT)
Dept: FAMILY MEDICINE CLINIC | Facility: CLINIC | Age: 53
End: 2023-10-23

## 2023-10-23 NOTE — TELEPHONE ENCOUNTER
Caller: JUAN C SALAZAR    Relationship: Emergency Contact    Best call back number: [phone]     Who are you requesting to speak with (clinical staff, provider,  specific staff member): CLINICAL STAFF     What was the call regarding: STATES FMLA PAPERWORK WAS SENT TO OFFICE LAST WEEK AND NEEDS IT UPDATED PLEASE CALL AND ADVISE THE STATUS

## 2023-11-01 ENCOUNTER — OFFICE VISIT (OUTPATIENT)
Dept: FAMILY MEDICINE CLINIC | Facility: CLINIC | Age: 53
End: 2023-11-01
Payer: COMMERCIAL

## 2023-11-01 VITALS
DIASTOLIC BLOOD PRESSURE: 86 MMHG | HEIGHT: 63 IN | BODY MASS INDEX: 31.89 KG/M2 | SYSTOLIC BLOOD PRESSURE: 118 MMHG | WEIGHT: 180 LBS | OXYGEN SATURATION: 96 % | RESPIRATION RATE: 16 BRPM | HEART RATE: 77 BPM

## 2023-11-01 DIAGNOSIS — R05.9 COUGH, UNSPECIFIED TYPE: ICD-10-CM

## 2023-11-01 DIAGNOSIS — R07.0 THROAT DISCOMFORT: ICD-10-CM

## 2023-11-01 DIAGNOSIS — I10 ESSENTIAL (PRIMARY) HYPERTENSION: Primary | ICD-10-CM

## 2023-11-01 PROCEDURE — 99213 OFFICE O/P EST LOW 20 MIN: CPT | Performed by: INTERNAL MEDICINE

## 2023-11-01 NOTE — PROGRESS NOTES
Subjective chief complaint is follow-up on blood pressure  Rosi Singleton is a 53 y.o. female.     History of Present Illness Rosi is here today for follow-up.  Her blood pressure seems to be doing better on the higher dose of Diovan.  I did review her blood pressure at home home.  She occasionally has some elevations but I do not think these are anything to be concerned about.  She did have a recent EGD and her esophagus looked normal.  She has not been taking the pantoprazole but she has developed a tickly sensation in her throat that is making her cough.  We did discuss that this could be coming from reflux.  However we will have her see a ear nose and throat doctor.  If he cannot find anything then we may have to consider whether or not the valsartan could be contributing to the cough.    The following portions of the patient's history were reviewed and updated as appropriate: allergies, current medications, and problem list.    Review of Systems   Constitutional:  Negative for chills and fever.   Respiratory:  Positive for cough.      Objective   Physical Exam  Vitals and nursing note reviewed.   Constitutional:       Appearance: Normal appearance.   Cardiovascular:      Rate and Rhythm: Normal rate and regular rhythm.   Pulmonary:      Effort: Pulmonary effort is normal.      Breath sounds: No wheezing, rhonchi or rales.   Neurological:      Mental Status: She is alert.       Assessment & Plan   Diagnoses and all orders for this visit:    1. Essential (primary) hypertension (Primary)    2. Throat discomfort  -     Ambulatory Referral to ENT (Otolaryngology)    3. Cough, unspecified type  -     Ambulatory Referral to ENT (Otolaryngology)    Rosi is here today for follow-up on the blood pressure.  That is doing better.  She is complaining of some abnormal sensations in her throat.  I am going to have her see an ear nose and throat doctor.  We cannot find an explanation for this then we may  consider changing her valsartan to something else.  However she has had trouble tolerating numerous other medications.

## 2023-11-08 ENCOUNTER — HOSPITAL ENCOUNTER (EMERGENCY)
Facility: HOSPITAL | Age: 53
Discharge: HOME | End: 2023-11-09
Attending: EMERGENCY MEDICINE
Payer: COMMERCIAL

## 2023-11-08 ENCOUNTER — HOSPITAL ENCOUNTER (OUTPATIENT)
Dept: MAMMOGRAPHY | Facility: HOSPITAL | Age: 53
Discharge: HOME | End: 2023-11-08
Admitting: INTERNAL MEDICINE
Payer: COMMERCIAL

## 2023-11-08 DIAGNOSIS — S39.012A STRAIN OF LUMBAR REGION, INITIAL ENCOUNTER: ICD-10-CM

## 2023-11-08 DIAGNOSIS — M54.9 MUSCULOSKELETAL BACK PAIN: Primary | ICD-10-CM

## 2023-11-08 DIAGNOSIS — Z12.31 ENCOUNTER FOR SCREENING MAMMOGRAM FOR MALIGNANT NEOPLASM OF BREAST: ICD-10-CM

## 2023-11-08 LAB
BASOPHILS # BLD AUTO: 0.03 10*3/MM3 (ref 0–0.2)
BASOPHILS NFR BLD AUTO: 0.3 % (ref 0–1.5)
DEPRECATED RDW RBC AUTO: 41 FL (ref 37–54)
EOSINOPHIL # BLD AUTO: 0.21 10*3/MM3 (ref 0–0.4)
EOSINOPHIL NFR BLD AUTO: 2.3 % (ref 0.3–6.2)
ERYTHROCYTE [DISTWIDTH] IN BLOOD BY AUTOMATED COUNT: 13.1 % (ref 12.3–15.4)
HCT VFR BLD AUTO: 37.4 % (ref 34–46.6)
HGB BLD-MCNC: 12.4 G/DL (ref 12–15.9)
IMM GRANULOCYTES # BLD AUTO: 0.02 10*3/MM3 (ref 0–0.05)
IMM GRANULOCYTES NFR BLD AUTO: 0.2 % (ref 0–0.5)
LYMPHOCYTES # BLD AUTO: 3.86 10*3/MM3 (ref 0.7–3.1)
LYMPHOCYTES NFR BLD AUTO: 42.5 % (ref 19.6–45.3)
MCH RBC QN AUTO: 28.6 PG (ref 26.6–33)
MCHC RBC AUTO-ENTMCNC: 33.2 G/DL (ref 31.5–35.7)
MCV RBC AUTO: 86.2 FL (ref 79–97)
MONOCYTES # BLD AUTO: 0.56 10*3/MM3 (ref 0.1–0.9)
MONOCYTES NFR BLD AUTO: 6.2 % (ref 5–12)
NEUTROPHILS NFR BLD AUTO: 4.4 10*3/MM3 (ref 1.7–7)
NEUTROPHILS NFR BLD AUTO: 48.5 % (ref 42.7–76)
NRBC BLD AUTO-RTO: 0 /100 WBC (ref 0–0.2)
PLATELET # BLD AUTO: 303 10*3/MM3 (ref 140–450)
PMV BLD AUTO: 10.2 FL (ref 6–12)
RBC # BLD AUTO: 4.34 10*6/MM3 (ref 3.77–5.28)
WBC NRBC COR # BLD: 9.08 10*3/MM3 (ref 3.4–10.8)

## 2023-11-08 PROCEDURE — 77067 SCR MAMMO BI INCL CAD: CPT

## 2023-11-08 PROCEDURE — 77063 BREAST TOMOSYNTHESIS BI: CPT

## 2023-11-08 PROCEDURE — 83690 ASSAY OF LIPASE: CPT | Performed by: EMERGENCY MEDICINE

## 2023-11-08 PROCEDURE — 96374 THER/PROPH/DIAG INJ IV PUSH: CPT

## 2023-11-08 PROCEDURE — 85025 COMPLETE CBC W/AUTO DIFF WBC: CPT | Performed by: EMERGENCY MEDICINE

## 2023-11-08 PROCEDURE — 80053 COMPREHEN METABOLIC PANEL: CPT | Performed by: EMERGENCY MEDICINE

## 2023-11-08 PROCEDURE — 96375 TX/PRO/DX INJ NEW DRUG ADDON: CPT

## 2023-11-08 PROCEDURE — 99284 EMERGENCY DEPT VISIT MOD MDM: CPT

## 2023-11-08 RX ORDER — SODIUM CHLORIDE 0.9 % (FLUSH) 0.9 %
10 SYRINGE (ML) INJECTION AS NEEDED
Status: DISCONTINUED | OUTPATIENT
Start: 2023-11-08 | End: 2023-11-09 | Stop reason: HOSPADM

## 2023-11-08 RX ORDER — KETOROLAC TROMETHAMINE 15 MG/ML
15 INJECTION, SOLUTION INTRAMUSCULAR; INTRAVENOUS ONCE
Status: COMPLETED | OUTPATIENT
Start: 2023-11-09 | End: 2023-11-09

## 2023-11-08 RX ORDER — MORPHINE SULFATE 2 MG/ML
4 INJECTION, SOLUTION INTRAMUSCULAR; INTRAVENOUS ONCE
Status: COMPLETED | OUTPATIENT
Start: 2023-11-09 | End: 2023-11-09

## 2023-11-08 RX ORDER — METHOCARBAMOL 750 MG/1
750 TABLET, FILM COATED ORAL ONCE
Status: COMPLETED | OUTPATIENT
Start: 2023-11-09 | End: 2023-11-09

## 2023-11-08 RX ORDER — ONDANSETRON 2 MG/ML
4 INJECTION INTRAMUSCULAR; INTRAVENOUS ONCE
Status: COMPLETED | OUTPATIENT
Start: 2023-11-09 | End: 2023-11-09

## 2023-11-08 NOTE — Clinical Note
[facility] EMERGENCY DEPARTMENT  [address] [facility] [address]  Phone: [phone]    Rosi Singleton was seen and treated in our emergency department on 11/8/2023.  She may return to work on 11/11/2023.         Thank you for choosing [facility].    Kenneth Le MD

## 2023-11-09 ENCOUNTER — APPOINTMENT (OUTPATIENT)
Dept: CT IMAGING | Facility: HOSPITAL | Age: 53
End: 2023-11-09
Payer: COMMERCIAL

## 2023-11-09 VITALS
OXYGEN SATURATION: 95 % | TEMPERATURE: 98.3 F | RESPIRATION RATE: 18 BRPM | WEIGHT: 177 LBS | HEIGHT: 63 IN | HEART RATE: 67 BPM | DIASTOLIC BLOOD PRESSURE: 89 MMHG | SYSTOLIC BLOOD PRESSURE: 133 MMHG | BODY MASS INDEX: 31.36 KG/M2

## 2023-11-09 LAB
ALBUMIN SERPL-MCNC: 4.9 G/DL (ref 3.5–5.2)
ALBUMIN/GLOB SERPL: 1.8 G/DL
ALP SERPL-CCNC: 86 U/L (ref 39–117)
ALT SERPL W P-5'-P-CCNC: 23 U/L (ref 1–33)
ANION GAP SERPL CALCULATED.3IONS-SCNC: 9 MMOL/L (ref 5–15)
AST SERPL-CCNC: 15 U/L (ref 1–32)
BACTERIA UR QL AUTO: ABNORMAL /HPF
BILIRUB SERPL-MCNC: <0.2 MG/DL (ref 0–1.2)
BILIRUB UR QL STRIP: NEGATIVE
BUN SERPL-MCNC: 25 MG/DL (ref 6–20)
BUN/CREAT SERPL: 33.3 (ref 7–25)
CALCIUM SPEC-SCNC: 9 MG/DL (ref 8.6–10.5)
CHLORIDE SERPL-SCNC: 106 MMOL/L (ref 98–107)
CLARITY UR: CLEAR
CO2 SERPL-SCNC: 26 MMOL/L (ref 22–29)
COLOR UR: YELLOW
CREAT SERPL-MCNC: 0.75 MG/DL (ref 0.57–1)
EGFRCR SERPLBLD CKD-EPI 2021: 94.2 ML/MIN/1.73
GLOBULIN UR ELPH-MCNC: 2.7 GM/DL
GLUCOSE SERPL-MCNC: 103 MG/DL (ref 65–99)
GLUCOSE UR STRIP-MCNC: NEGATIVE MG/DL
HGB UR QL STRIP.AUTO: NEGATIVE
HYALINE CASTS UR QL AUTO: ABNORMAL /LPF
KETONES UR QL STRIP: NEGATIVE
LEUKOCYTE ESTERASE UR QL STRIP.AUTO: ABNORMAL
LIPASE SERPL-CCNC: 23 U/L (ref 13–60)
NITRITE UR QL STRIP: NEGATIVE
PH UR STRIP.AUTO: <=5 [PH] (ref 5–8)
POTASSIUM SERPL-SCNC: 3.7 MMOL/L (ref 3.5–5.2)
PROT SERPL-MCNC: 7.6 G/DL (ref 6–8.5)
PROT UR QL STRIP: NEGATIVE
RBC # UR STRIP: ABNORMAL /HPF
REF LAB TEST METHOD: ABNORMAL
SODIUM SERPL-SCNC: 141 MMOL/L (ref 136–145)
SP GR UR STRIP: 1.02 (ref 1–1.03)
SQUAMOUS #/AREA URNS HPF: ABNORMAL /HPF
UROBILINOGEN UR QL STRIP: ABNORMAL
WBC # UR STRIP: ABNORMAL /HPF

## 2023-11-09 PROCEDURE — 25010000002 ONDANSETRON PER 1 MG: Performed by: EMERGENCY MEDICINE

## 2023-11-09 PROCEDURE — 25810000003 SODIUM CHLORIDE 0.9 % SOLUTION: Performed by: EMERGENCY MEDICINE

## 2023-11-09 PROCEDURE — 25010000002 KETOROLAC TROMETHAMINE PER 15 MG: Performed by: EMERGENCY MEDICINE

## 2023-11-09 PROCEDURE — 74176 CT ABD & PELVIS W/O CONTRAST: CPT

## 2023-11-09 PROCEDURE — 25010000002 MORPHINE PER 10 MG: Performed by: EMERGENCY MEDICINE

## 2023-11-09 PROCEDURE — 81001 URINALYSIS AUTO W/SCOPE: CPT | Performed by: EMERGENCY MEDICINE

## 2023-11-09 RX ORDER — METHOCARBAMOL 750 MG/1
750 TABLET, FILM COATED ORAL 3 TIMES DAILY PRN
Qty: 15 TABLET | Refills: 0 | Status: SHIPPED | OUTPATIENT
Start: 2023-11-09

## 2023-11-09 RX ORDER — NAPROXEN 500 MG/1
500 TABLET ORAL 2 TIMES DAILY PRN
Qty: 20 TABLET | Refills: 0 | Status: SHIPPED | OUTPATIENT
Start: 2023-11-09

## 2023-11-09 RX ADMIN — KETOROLAC TROMETHAMINE 15 MG: 15 INJECTION, SOLUTION INTRAMUSCULAR; INTRAVENOUS at 00:01

## 2023-11-09 RX ADMIN — SODIUM CHLORIDE 1000 ML: 9 INJECTION, SOLUTION INTRAVENOUS at 00:01

## 2023-11-09 RX ADMIN — METHOCARBAMOL TABLETS 750 MG: 750 TABLET, COATED ORAL at 00:15

## 2023-11-09 RX ADMIN — ONDANSETRON 4 MG: 2 INJECTION INTRAMUSCULAR; INTRAVENOUS at 00:01

## 2023-11-09 RX ADMIN — MORPHINE SULFATE 4 MG: 2 INJECTION, SOLUTION INTRAMUSCULAR; INTRAVENOUS at 00:01

## 2023-11-09 NOTE — ED NOTES
Pt dc aaox4 patent airway & steady gait out of er with daughter to drive home. Pt and daughter verbalized understanding of dc teaching .

## 2023-11-09 NOTE — ED PROVIDER NOTES
EMERGENCY DEPARTMENT ENCOUNTER    Room Number:  12/12  PCP: Floyd Rodriguez MD  Historian: Patient      HPI:  Chief Complaint: Left flank pain  A complete HPI/ROS/PMH/PSH/SH/FH are unobtainable due to: None  Context: Rosi Singleton is a 53 y.o. 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  She reports that the pain is a sharp sensation that is made worse by bending and movements.  She denies dysuria/hematuria, fever/chills, nausea/vomiting, radiation of the pain, or abdominal pain.            PAST MEDICAL HISTORY  Active Ambulatory Problems     Diagnosis Date Noted    Arthritis 10/14/2020    Essential (primary) hypertension 10/14/2020    Occipital headache 10/14/2020    Gastroesophageal reflux disease without esophagitis 10/14/2020    Dupuytren's contracture of both hands 10/14/2020    Hyperglycemia 10/14/2020    Elevated transaminase level 10/14/2020    Impaired glucose tolerance 12/17/2020    Encounter for screening for malignant neoplasm of colon 06/01/2022    Epigastric pain 06/01/2022    Nausea and vomiting 06/01/2022    Heartburn 06/01/2022     Resolved Ambulatory Problems     Diagnosis Date Noted    Sore throat 10/14/2020     Past Medical History:   Diagnosis Date    Hypertension          PAST SURGICAL HISTORY  Past Surgical History:   Procedure Laterality Date    COLONOSCOPY      COLONOSCOPY N/A 8/1/2023    Procedure: COLONOSCOPY into cecum and normal TI  with bx's and cold bx polypectomy;  Surgeon: Pablo Lema MD;  Location: [facility] ENDOSCOPY;  Service: Gastroenterology;  Laterality: N/A;  pre: diarrhea  post: polyp    ENDOSCOPY N/A 8/1/2023    Procedure: ESOPHAGOGASTRODUODENOSCOPY with bx;  Surgeon: Pablo Lema MD;  Location: [facility] ENDOSCOPY;  Service: Gastroenterology;  Laterality: N/A;  pre: ABD pain, heartburn  post: normal         FAMILY HISTORY  History reviewed. No  pertinent family history.      SOCIAL HISTORY  Social History     Socioeconomic History    Marital status:    Tobacco Use    Smoking status: Never    Smokeless tobacco: Never   Vaping Use    Vaping Use: Never used   Substance and Sexual Activity    Alcohol use: No    Drug use: No    Sexual activity: Defer         ALLERGIES  Patient has no known allergies.        REVIEW OF SYSTEMS  Review of Systems   Constitutional:  Negative for fever.   HENT:  Negative for sore throat.    Eyes: Negative.    Respiratory:  Negative for cough and shortness of breath.    Cardiovascular:  Negative for chest pain.   Gastrointestinal:  Negative for abdominal pain, diarrhea and vomiting.   Genitourinary:  Positive for flank pain. Negative for dysuria.   Musculoskeletal:  Positive for back pain. Negative for neck pain.   Skin:  Negative for rash.   Allergic/Immunologic: Negative.    Neurological:  Negative for weakness, numbness and headaches.   Hematological: Negative.    Psychiatric/Behavioral: Negative.     All other systems reviewed and are negative.         PHYSICAL EXAM  ED Triage Vitals [11/08/23 2300]   Temp Heart Rate Resp BP SpO2   98.3 °F (36.8 °C) 110 18 160/96 96 %      Temp src Heart Rate Source Patient Position BP Location FiO2 (%)   Tympanic Monitor Sitting -- --       Physical Exam  Constitutional:       General: She is not in acute distress.     Appearance: Normal appearance. She is not ill-appearing or toxic-appearing.   HENT:      Head: Normocephalic and atraumatic.   Eyes:      Extraocular Movements: Extraocular movements intact.      Pupils: Pupils are equal, round, and reactive to light.   Cardiovascular:      Rate and Rhythm: Normal rate and regular rhythm.      Heart sounds: No murmur heard.     No friction rub. No gallop.   Pulmonary:      Effort: Pulmonary effort is normal.      Breath sounds: Normal breath sounds.   Abdominal:      General: Abdomen is flat. There is no distension.      Palpations:  Abdomen is soft.      Tenderness: There is no abdominal tenderness.   Musculoskeletal:         General: No swelling or tenderness. Normal range of motion.      Cervical back: Normal range of motion and neck supple.   Skin:     General: Skin is warm and dry.   Neurological:      General: No focal deficit present.      Mental Status: She is alert and oriented to person, place, and time.      Sensory: No sensory deficit.      Motor: No weakness.   Psychiatric:         Mood and Affect: Mood normal.         Behavior: Behavior normal.           Vital signs and nursing notes reviewed.          LAB RESULTS  Recent Results (from the past 24 hour(s))   Comprehensive Metabolic Panel    Collection Time: 11/08/23 11:28 PM    Specimen: Blood   Result Value Ref Range    Glucose 103 (H) 65 - 99 mg/dL    BUN 25 (H) 6 - 20 mg/dL    Creatinine 0.75 0.57 - 1.00 mg/dL    Sodium 141 136 - 145 mmol/L    Potassium 3.7 3.5 - 5.2 mmol/L    Chloride 106 98 - 107 mmol/L    CO2 26.0 22.0 - 29.0 mmol/L    Calcium 9.0 8.6 - 10.5 mg/dL    Total Protein 7.6 6.0 - 8.5 g/dL    Albumin 4.9 3.5 - 5.2 g/dL    ALT (SGPT) 23 1 - 33 U/L    AST (SGOT) 15 1 - 32 U/L    Alkaline Phosphatase 86 39 - 117 U/L    Total Bilirubin <0.2 0.0 - 1.2 mg/dL    Globulin 2.7 gm/dL    A/G Ratio 1.8 g/dL    BUN/Creatinine Ratio 33.3 (H) 7.0 - 25.0    Anion Gap 9.0 5.0 - 15.0 mmol/L    eGFR 94.2 >60.0 mL/min/1.73   Lipase    Collection Time: 11/08/23 11:28 PM    Specimen: Blood   Result Value Ref Range    Lipase 23 13 - 60 U/L   CBC Auto Differential    Collection Time: 11/08/23 11:28 PM    Specimen: Blood   Result Value Ref Range    WBC 9.08 3.40 - 10.80 10*3/mm3    RBC 4.34 3.77 - 5.28 10*6/mm3    Hemoglobin 12.4 12.0 - 15.9 g/dL    Hematocrit 37.4 34.0 - 46.6 %    MCV 86.2 79.0 - 97.0 fL    MCH 28.6 26.6 - 33.0 pg    MCHC 33.2 31.5 - 35.7 g/dL    RDW 13.1 12.3 - 15.4 %    RDW-SD 41.0 37.0 - 54.0 fl    MPV 10.2 6.0 - 12.0 fL    Platelets 303 140 - 450 10*3/mm3    Neutrophil  % 48.5 42.7 - 76.0 %    Lymphocyte % 42.5 19.6 - 45.3 %    Monocyte % 6.2 5.0 - 12.0 %    Eosinophil % 2.3 0.3 - 6.2 %    Basophil % 0.3 0.0 - 1.5 %    Immature Grans % 0.2 0.0 - 0.5 %    Neutrophils, Absolute 4.40 1.70 - 7.00 10*3/mm3    Lymphocytes, Absolute 3.86 (H) 0.70 - 3.10 10*3/mm3    Monocytes, Absolute 0.56 0.10 - 0.90 10*3/mm3    Eosinophils, Absolute 0.21 0.00 - 0.40 10*3/mm3    Basophils, Absolute 0.03 0.00 - 0.20 10*3/mm3    Immature Grans, Absolute 0.02 0.00 - 0.05 10*3/mm3    nRBC 0.0 0.0 - 0.2 /100 WBC   Urinalysis With Microscopic If Indicated (No Culture) - Urine, Clean Catch    Collection Time: 11/09/23  1:42 AM    Specimen: Urine, Clean Catch   Result Value Ref Range    Color, UA Yellow Yellow, Straw    Appearance, UA Clear Clear    pH, UA <=5.0 5.0 - 8.0    Specific Gravity, UA 1.020 1.005 - 1.030    Glucose, UA Negative Negative    Ketones, UA Negative Negative    Bilirubin, UA Negative Negative    Blood, UA Negative Negative    Protein, UA Negative Negative    Leuk Esterase, UA Small (1+) (A) Negative    Nitrite, UA Negative Negative    Urobilinogen, UA 0.2 E.U./dL 0.2 - 1.0 E.U./dL   Urinalysis, Microscopic Only - Urine, Clean Catch    Collection Time: 11/09/23  1:42 AM    Specimen: Urine, Clean Catch   Result Value Ref Range    RBC, UA 0-2 None Seen, 0-2 /HPF    WBC, UA 3-5 (A) None Seen, 0-2 /HPF    Bacteria, UA None Seen None Seen /HPF    Squamous Epithelial Cells, UA 0-2 None Seen, 0-2 /HPF    Hyaline Casts, UA 0-2 None Seen /LPF    Methodology Automated Microscopy        Ordered the above labs and reviewed the results.        RADIOLOGY  CT Abdomen Pelvis Without Contrast    Result Date: 11/9/2023  Patient: MARGARITA RANDLE  Time Out: 03:28 Exam(s): CT ABDOMEN + PELVIS Without Contrast EXAM:   CT Abdomen and Pelvis Without Intravenous Contrast CLINICAL HISTORY:    Reason for exam: Flank pain, kidney stone suspected. TECHNIQUE:   Axial computed tomography images of the abdomen and  pelvis without intravenous contrast.  CTDI is 12.71 mGy and DLP is 627.5 mGy-cm.  This CT exam was performed according to the principle of ALARA (As Low As Reasonably Achievable) by using one or more of the following dose reduction techniques: automated exposure control, adjustment of the mA and or kV according to patient size, and or use of iterative reconstruction technique. COMPARISON:   6 17 2022 FINDINGS:   Lung bases:  Unremarkable.  No mass.  No consolidation.  ABDOMEN:   Liver:  1.3 cm right hepatic lobe hypodensity likely representing a cyst.  Hepatomegaly.  Fatty infiltration of liver   Gallbladder and bile ducts:  Unremarkable.  No calcified stones.  No ductal dilation.   Pancreas:  Unremarkable.  No ductal dilation.   Spleen:  Unremarkable.  No splenomegaly.   Adrenals:  Unremarkable.  No mass.   Kidneys and ureters:  Unremarkable.  No obstructing stones.  No hydronephrosis.   Stomach and bowel:  Unremarkable.  No obstruction.  No mucosal thickening.  PELVIS:   Appendix:  No findings to suggest acute appendicitis.   Bladder:  Unremarkable.  No stones.   Reproductive:  Fibroid uterus.  ABDOMEN and PELVIS:   Intraperitoneal space:  Unremarkable.  No free air.  No significant fluid collection.   Bones joints:  No acute fracture.  No dislocation.   Soft tissues:  Unremarkable.   Vasculature:  Unremarkable.  No abdominal aortic aneurysm.   Lymph nodes:  Unremarkable.  No enlarged lymph nodes. IMPRESSION:       No acute findings in the abdomen or pelvis. Fatty infiltration of liver Fibroid uterus No hydroureteronephrosis.  No renal or ureteral calculus.     Electronically signed by Bert Shoemaker MD on 11-09-23 at 0328    Mammo Screening Digital Tomosynthesis Bilateral With CAD    Result Date: 11/8/2023  MAMMO SCREENING DIGITAL TOMOSYNTHESIS BILATERAL W CAD-  CLINICAL INDICATION: 53 years old female presents for annual screening mammogram.  COMPARISON: 7/13/2022 and 3/21/2019.  TECHNIQUE: 2-D and  tomosynthesis MLO and CC views of the breast(s) were obtained  FINDINGS: There are scattered areas of fibroglandular density.  There are no suspicious masses, calcifications, or areas of architectural distortion.   IMPRESSION/RECOMMENDATION(S): No mammographic evidence of malignancy. Recommend annual screening mammogram in one year.  BI-RADS Category 1: Negative    This report was finalized on 11/8/2023 3:47 PM by Dr. Indigo Chaves M.D on Workstation: [id number]       Ordered the above noted radiological studies. Reviewed by me in PACS.            PROCEDURES  Procedures            MEDICATIONS GIVEN IN ER  Medications   sodium chloride 0.9 % flush 10 mL (has no administration in time range)   sodium chloride 0.9 % flush 10 mL (has no administration in time range)   sodium chloride 0.9 % bolus 1,000 mL (0 mL Intravenous Stopped 11/9/23 0117)   ondansetron (ZOFRAN) injection 4 mg (4 mg Intravenous Given 11/9/23 0001)   ketorolac (TORADOL) injection 15 mg (15 mg Intravenous Given 11/9/23 0001)   morphine injection 4 mg (4 mg Intravenous Given 11/9/23 0001)   methocarbamol (ROBAXIN) tablet 750 mg (750 mg Oral Given 11/9/23 0015)                   MEDICAL DECISION MAKING, PROGRESS, and CONSULTS    All labs have been independently reviewed by me.  All radiology studies have been reviewed by me and I have also reviewed the radiology report.   EKG's independently viewed and interpreted by me.  Discussion below represents my analysis of pertinent findings related to patient's condition, differential diagnosis, treatment plan and final disposition.      Additional sources:  - Discussed/ obtained information from independent historians: History obtained from the patient herself at bedside.    - External (non-ED) record review: Upon medical records review, the patient was last seen and evaluated in the outpatient office of primary care on 11/1/2023 in follow-up for her known essential hypertension.    - Chronic or social  conditions impacting care: None reported    - Shared decision making: Discharge decision based on shared conversations have between myself as well as the patient at bedside.      Orders placed during this visit:  Orders Placed This Encounter   Procedures    CT Abdomen Pelvis Without Contrast    Comprehensive Metabolic Panel    Lipase    Urinalysis With Microscopic If Indicated (No Culture) - Urine, Clean Catch    CBC Auto Differential    Urinalysis, Microscopic Only - Urine, Clean Catch    Insert Peripheral IV    Insert Peripheral IV    CBC & Differential           Differential diagnosis includes but is not limited to:    Differential diagnosis includes but is not limited to musculoskeletal lumbar pain, muscle spasm, lumbar strain, acute pyelonephritis, renal colic, or ureterolithiasis.      Independent interpretation of labs, radiology studies, and discussions with consultants:    CT scan of the abdomen and pelvis was independently interpreted by myself with my interpretation showing no ureterolithiasis, ureteral stranding, nor obvious bowel process.        ED Course as of 11/09/23 0407   Thu Nov 09, 2023   0338 On reevaluation, the patient is resting comfortably and without further complaints.  She reports that her back pain has significantly improved post ED treatment.  I informed her that her labs, urinalysis, as well as CT scan of the abdomen and pelvis are all unremarkable.  We will place her on anti-inflammatories and muscle relaxers secondary to the most likely musculoskeletal pain.  They are in agreement with that plan and all questions have been answered. [BM]      ED Course User Index  [BM] Kenneth Le MD             DIAGNOSIS  Final diagnoses:   Musculoskeletal back pain   Strain of lumbar region, initial encounter         DISPOSITION  DISCHARGE    Patient discharged in stable condition.    Reviewed implications of results, diagnosis, meds, responsibility to follow up, warning signs and symptoms  of possible worsening, potential complications and reasons to return to ER.    Patient/Family voiced understanding of above instructions.    Discussed plan for discharge, as there is no emergent indication for admission. Patient referred to primary care provider for BP management due to today's BP. Pt/family is agreeable and understands need for follow up and repeat testing.  Pt is aware that discharge does not mean that nothing is wrong but it indicates no emergency is present that requires admission and they must continue care with follow-up as given below or physician of their choice.     FOLLOW-UP  Floyd Rodriguez MD  [address] [place] [id number] [phone]    Schedule an appointment as soon as possible for a visit            Medication List        New Prescriptions      methocarbamol 750 MG tablet  Commonly known as: ROBAXIN  Take 1 tablet by mouth 3 (Three) Times a Day As Needed for Muscle Spasms.               Where to Get Your Medications        These medications were sent to [id number] - [place], [place] - [address]  AT [facility] - [phone] [facility] [phone]   [address] , [facility] [address]      Phone: [phone]   methocarbamol 750 MG tablet  naproxen 500 MG EC tablet                   Latest Documented Vital Signs:  As of 04:07 EST  BP- 133/89 HR- 67 Temp- 98.3 °F (36.8 °C) (Tympanic) O2 sat- 95%              --    Please note that portions of this were completed with a voice recognition program.       Note Disclaimer: At [facility], we believe that sharing information builds trust and better relationships. You are receiving this note because you are receiving care at [facility] or recently visited. It is possible you will see health information before a provider has talked with you about it. This kind of information can be easy to misunderstand. To help you fully understand what it means for your health, we urge you to discuss  this note with your provider.             Kenneth Le MD  11/09/23 0917

## 2023-11-16 ENCOUNTER — TELEPHONE (OUTPATIENT)
Dept: FAMILY MEDICINE CLINIC | Facility: CLINIC | Age: 53
End: 2023-11-16

## 2023-11-16 NOTE — TELEPHONE ENCOUNTER
Caller: JUAN C SALAZAR    Relationship: Emergency Contact    Best call back number:     [phone]       What form or medical record are you requesting: [employer]    Additional notes:       DAUGHTER SAID THAT THE PAPERWORK WAS FILLED OUT WRONG AND NEEDS TO BE CORRECTED.      SHE NEEDS FOR SOMEONE TO CALL HER BACK TO DISCUSS THIS PLEASE

## 2023-11-17 NOTE — TELEPHONE ENCOUNTER
Relay: Spoke with patient to see what's incorrect on the form, the patient states [place] paperwork should say [place]. I states that is what checked on page 2 of documents.

## 2023-12-05 ENCOUNTER — TELEPHONE (OUTPATIENT)
Dept: FAMILY MEDICINE CLINIC | Facility: CLINIC | Age: 53
End: 2023-12-05

## 2023-12-05 NOTE — TELEPHONE ENCOUNTER
Hub staff attempted to follow warm transfer process and was unsuccessful     Caller: JUAN C SALAZAR    Relationship to patient: Emergency Contact    Best call back number: [phone]     Patient is needing:       PATIENT IS STILL WAITING FOR SOMEONE TO CALL THEM TO SET UP A APPOINTMENT FOR EARS, NOSE, AND THROAT.  IT HAS BEEN OVER A MONTH AND NO ONE HAS CALLED THEM.  THEY WOULD LIKE A PHONE NUMBER SO THAT THEY CAN CALL AND SET UP A SCHEDULED APPOINTMENT.      PLEASE CALL AND ADVISE.

## 2023-12-06 ENCOUNTER — TELEPHONE (OUTPATIENT)
Dept: FAMILY MEDICINE CLINIC | Facility: CLINIC | Age: 53
End: 2023-12-06
Payer: COMMERCIAL

## 2024-10-14 ENCOUNTER — TRANSCRIBE ORDERS (OUTPATIENT)
Dept: ADMINISTRATIVE | Facility: HOSPITAL | Age: 54
End: 2024-10-14
Payer: COMMERCIAL

## 2024-10-14 DIAGNOSIS — Z12.31 BREAST CANCER SCREENING BY MAMMOGRAM: Primary | ICD-10-CM

## 2024-10-18 ENCOUNTER — OFFICE VISIT (OUTPATIENT)
Dept: FAMILY MEDICINE CLINIC | Facility: CLINIC | Age: 54
End: 2024-10-18
Payer: COMMERCIAL

## 2024-10-18 VITALS
BODY MASS INDEX: 30.16 KG/M2 | DIASTOLIC BLOOD PRESSURE: 88 MMHG | RESPIRATION RATE: 18 BRPM | OXYGEN SATURATION: 97 % | WEIGHT: 170.2 LBS | SYSTOLIC BLOOD PRESSURE: 142 MMHG | HEART RATE: 76 BPM | HEIGHT: 63 IN

## 2024-10-18 DIAGNOSIS — S29.012A STRAIN OF RIGHT LATISSIMUS DORSI MUSCLE: ICD-10-CM

## 2024-10-18 DIAGNOSIS — M17.11 PRIMARY OSTEOARTHRITIS OF RIGHT KNEE: ICD-10-CM

## 2024-10-18 DIAGNOSIS — R05.8 ALLERGIC COUGH: Primary | ICD-10-CM

## 2024-10-18 DIAGNOSIS — I10 ESSENTIAL (PRIMARY) HYPERTENSION: ICD-10-CM

## 2024-10-18 PROCEDURE — 99214 OFFICE O/P EST MOD 30 MIN: CPT | Performed by: INTERNAL MEDICINE

## 2024-10-18 RX ORDER — LEVOCETIRIZINE DIHYDROCHLORIDE 5 MG/1
5 TABLET, FILM COATED ORAL EVERY EVENING
Qty: 30 TABLET | Refills: 1 | Status: SHIPPED | OUTPATIENT
Start: 2024-10-18

## 2024-10-18 RX ORDER — BENZONATATE 100 MG/1
100 CAPSULE ORAL 3 TIMES DAILY
Qty: 30 CAPSULE | Refills: 0 | Status: SHIPPED | OUTPATIENT
Start: 2024-10-18

## 2024-10-18 NOTE — PROGRESS NOTES
Subjective chief complaint is a cough  Rosi Singleton is a 54 y.o. female.     Cough   Gulzhamal is complaining of a cough.  This is been present for approximately 1 month.  It is intermittent.  She feels like there is stuff in the back of her throat that she has to clear out.  Initially she was coughing up some green-colored stuff but now it is clear.  She does not feel short of breath.  She has not had fever or chills.  She is complaining of some pain in her right knee.  This is on the medial aspect of her right knee.  It is worse with weightbearing.  She also complains of feeling a lump in the right posterior axillary line.    The following portions of the patient's history were reviewed and updated as appropriate: allergies, current medications, and problem list.    Review of Systems   Respiratory:  Positive for cough.      Objective   Physical Exam  Vitals and nursing note reviewed.   Constitutional:       Appearance: Normal appearance.   HENT:      Right Ear: Tympanic membrane and ear canal normal.      Left Ear: Tympanic membrane and ear canal normal.      Nose: Rhinorrhea present.      Mouth/Throat:      Mouth: Mucous membranes are moist.      Pharynx: Oropharynx is clear. No oropharyngeal exudate or posterior oropharyngeal erythema.   Cardiovascular:      Rate and Rhythm: Normal rate and regular rhythm.   Pulmonary:      Effort: Pulmonary effort is normal.      Breath sounds: No wheezing, rhonchi or rales.   Musculoskeletal:      Comments: Her right knee has no warmth.  There is no erythema.  She has fairly normal range of motion in terms of flexion and extension.  There is some mild crepitation with some tenderness at the medial joint line   Skin:     Comments: I do not feel any lumps in the region in question of her pain near the right posterior axillary line.  She does have some tenderness to the latissimus dorsi in that region.   Neurological:      Mental Status: She is alert.     Assessment &  Plan   Diagnoses and all orders for this visit:    1. Allergic cough (Primary)    2. Essential (primary) hypertension    3. Primary osteoarthritis of right knee    4. Strain of right latissimus dorsi muscle    Other orders  -     levocetirizine (XYZAL) 5 MG tablet; Take 1 tablet by mouth Every Evening.  Dispense: 30 tablet; Refill: 1  -     benzonatate (Tessalon Perles) 100 MG capsule; Take 1 capsule by mouth 3 times a day.  Dispense: 30 capsule; Refill: 0    Rosi is here for her cough.  I believe this is going to be more allergic.  I am going to treat her with some Xyzal and Tessalon Perles.  Unfortunately I cannot seem to get her to stay on any type of blood pressure medicine.  Her blood pressure is not too bad today.  For her arthritic knee we did advise using extra strength Tylenol 2 with breakfast and dinner.  That may help some with her latissimus dorsi pain.

## 2024-10-28 ENCOUNTER — TELEPHONE (OUTPATIENT)
Dept: FAMILY MEDICINE CLINIC | Facility: CLINIC | Age: 54
End: 2024-10-28

## 2024-10-28 NOTE — TELEPHONE ENCOUNTER
PATIENT'S DAUGHTER IN LAW JUAN C CALLED IN REGARDS TO [facility] PAPERWORK, IF IT WAS RECEIVED     PLEASE CALL [phone]

## 2024-10-28 NOTE — TELEPHONE ENCOUNTER
Relay:  Called to advise FMLA paperwork is ready to be picked up or faxed. However there is a $20.00 fee due payable by card only. Someone picked up and disconnected the line.

## 2024-11-04 ENCOUNTER — TELEPHONE (OUTPATIENT)
Dept: FAMILY MEDICINE CLINIC | Facility: CLINIC | Age: 54
End: 2024-11-04

## 2024-11-04 NOTE — TELEPHONE ENCOUNTER
Name: ILIRWINVAJUAN C      Relationship: Emergency Contact      Best Callback Number: [phone]       HUB PROVIDED THE RELAY MESSAGE FROM THE OFFICE      PATIENT: HAS FURTHER QUESTIONS AND WOULD LIKE A CALL BACK AT THE FOLLOWING PHONE NUMBER

## 2024-11-04 NOTE — TELEPHONE ENCOUNTER
Relay:  Spoke with patients daughter in law who will be calling or coming to make a payment using card tomorrow by 4:15 pm.

## 2024-11-20 NOTE — TELEPHONE ENCOUNTER
Caller: JUAN C SALAZAR    Relationship: Emergency Contact    Best call back number: [phone]     Who are you requesting to speak with (clinical staff, provider,  specific staff member): CLINICAL STAFF     What was the call regarding: STATES PAID THE FEE FOR [place] PAPERWORK BUT THE COMPANY HAS NEVER RECEIVED PAPER WORK WANTING TO KNOW WHAT S GOING ON AND IF IT CAN BE RESENT

## 2024-12-10 ENCOUNTER — HOSPITAL ENCOUNTER (OUTPATIENT)
Dept: MAMMOGRAPHY | Facility: HOSPITAL | Age: 54
Discharge: HOME | End: 2024-12-10
Admitting: INTERNAL MEDICINE
Payer: COMMERCIAL

## 2024-12-10 DIAGNOSIS — Z12.31 BREAST CANCER SCREENING BY MAMMOGRAM: ICD-10-CM

## 2024-12-10 PROCEDURE — 77063 BREAST TOMOSYNTHESIS BI: CPT

## 2024-12-10 PROCEDURE — 77067 SCR MAMMO BI INCL CAD: CPT

## 2025-04-11 NOTE — H&P
[facility]  Pre Procedure History & Physical    Chief Complaint:   Time for my colonoscopy    Subjective     HPI:   52 y.o. female presenting to endoscopy unit today for surveillance colonoscopy.    Past Medical History:   Past Medical History:   Diagnosis Date    Hypertension        Family History:  History reviewed. No pertinent family history.    Social History:   reports that she has never smoked. She has never used smokeless tobacco. She reports that she does not drink alcohol and does not use drugs.    Medications:   Medications Prior to Admission   Medication Sig Dispense Refill Last Dose    Diclofenac Sodium (Voltaren) 1 % gel gel Apply 2 g topically to the appropriate area as directed 4 (Four) Times a Day. 100 g 1 More than a month    naproxen (EC NAPROSYN) 500 MG EC tablet Take 1 tablet by mouth 2 (Two) Times a Day As Needed for Mild Pain. 20 tablet 0 More than a month    ondansetron (ZOFRAN) 4 MG tablet Take 1 tablet by mouth Daily. Take with valsartan 30 tablet 1 More than a month    pantoprazole (PROTONIX) 40 MG EC tablet Take 1 tablet by mouth Daily. 90 tablet 3 More than a month    promethazine-dextromethorphan (PROMETHAZINE-DM) 6.25-15 MG/5ML syrup Take 5 mL by mouth 4 (Four) Times a Day As Needed for Cough. 120 mL 0 More than a month    valsartan (Diovan) 80 MG tablet Take 1 tablet by mouth Daily. 30 tablet 5 More than a month       Allergies:  Patient has no known allergies.    Objective     not currently breastfeeding.  Physical Exam:   General: patient awake, alert and cooperative    Assessment & Plan     Diagnosis:  Personal hx of colon polyps  gerd    Anticipated Surgical Procedure:  Colonoscopy  Egd     The risks, benefits, and alternatives of this procedure have been discussed with the patient or the responsible party- the patient understands and agrees to proceed.                                                                 
Quality 226: Preventive Care And Screening: Tobacco Use: Screening And Cessation Intervention: Patient screened for tobacco use and is an ex/non-smoker
Quality 130: Documentation Of Current Medications In The Medical Record: Current Medications Documented
Detail Level: Detailed

## 2025-04-25 ENCOUNTER — OFFICE VISIT (OUTPATIENT)
Dept: FAMILY MEDICINE CLINIC | Facility: CLINIC | Age: 55
End: 2025-04-25
Payer: COMMERCIAL

## 2025-04-25 ENCOUNTER — HOSPITAL ENCOUNTER (OUTPATIENT)
Dept: GENERAL RADIOLOGY | Facility: HOSPITAL | Age: 55
Discharge: HOME | End: 2025-04-25
Admitting: INTERNAL MEDICINE
Payer: COMMERCIAL

## 2025-04-25 VITALS
HEIGHT: 63 IN | TEMPERATURE: 98.2 F | RESPIRATION RATE: 16 BRPM | HEART RATE: 86 BPM | OXYGEN SATURATION: 98 % | WEIGHT: 154 LBS | BODY MASS INDEX: 27.29 KG/M2 | DIASTOLIC BLOOD PRESSURE: 106 MMHG | SYSTOLIC BLOOD PRESSURE: 152 MMHG

## 2025-04-25 DIAGNOSIS — R06.2 WHEEZING: ICD-10-CM

## 2025-04-25 DIAGNOSIS — R73.02 IMPAIRED GLUCOSE TOLERANCE: ICD-10-CM

## 2025-04-25 DIAGNOSIS — J40 BRONCHITIS: ICD-10-CM

## 2025-04-25 DIAGNOSIS — I10 ESSENTIAL (PRIMARY) HYPERTENSION: ICD-10-CM

## 2025-04-25 DIAGNOSIS — J40 BRONCHITIS: Primary | ICD-10-CM

## 2025-04-25 LAB
ALBUMIN SERPL-MCNC: 4.3 G/DL (ref 3.5–5.2)
ALBUMIN/GLOB SERPL: 1.4 G/DL
ALP SERPL-CCNC: 97 U/L (ref 39–117)
ALT SERPL-CCNC: 16 U/L (ref 1–33)
AST SERPL-CCNC: 13 U/L (ref 1–32)
BILIRUB SERPL-MCNC: 0.3 MG/DL (ref 0–1.2)
BUN SERPL-MCNC: 14 MG/DL (ref 6–20)
BUN/CREAT SERPL: 20.3 (ref 7–25)
CALCIUM SERPL-MCNC: 9.6 MG/DL (ref 8.6–10.5)
CHLORIDE SERPL-SCNC: 107 MMOL/L (ref 98–107)
CHOLEST SERPL-MCNC: 177 MG/DL (ref 0–200)
CO2 SERPL-SCNC: 26.5 MMOL/L (ref 22–29)
CREAT SERPL-MCNC: 0.69 MG/DL (ref 0.57–1)
EGFRCR SERPLBLD CKD-EPI 2021: 102 ML/MIN/1.73
GLOBULIN SER CALC-MCNC: 3 GM/DL
GLUCOSE SERPL-MCNC: 106 MG/DL (ref 65–99)
HDLC SERPL-MCNC: 52 MG/DL (ref 40–60)
LDLC SERPL CALC-MCNC: 110 MG/DL (ref 0–100)
POTASSIUM SERPL-SCNC: 5 MMOL/L (ref 3.5–5.2)
PROT SERPL-MCNC: 7.3 G/DL (ref 6–8.5)
SODIUM SERPL-SCNC: 144 MMOL/L (ref 136–145)
TRIGL SERPL-MCNC: 81 MG/DL (ref 0–150)
VLDLC SERPL CALC-MCNC: 15 MG/DL (ref 5–40)

## 2025-04-25 PROCEDURE — 71046 X-RAY EXAM CHEST 2 VIEWS: CPT

## 2025-04-25 PROCEDURE — 99214 OFFICE O/P EST MOD 30 MIN: CPT | Performed by: INTERNAL MEDICINE

## 2025-04-25 RX ORDER — PREDNISONE 20 MG/1
20 TABLET ORAL 2 TIMES DAILY
Qty: 10 TABLET | Refills: 0 | Status: SHIPPED | OUTPATIENT
Start: 2025-04-25

## 2025-04-25 RX ORDER — ATENOLOL 25 MG/1
25 TABLET ORAL DAILY
Qty: 30 TABLET | Refills: 5 | Status: SHIPPED | OUTPATIENT
Start: 2025-04-25

## 2025-04-25 RX ORDER — AZITHROMYCIN 250 MG/1
TABLET, FILM COATED ORAL
Qty: 6 TABLET | Refills: 0 | Status: SHIPPED | OUTPATIENT
Start: 2025-04-25

## 2025-04-25 NOTE — PROGRESS NOTES
Subjective chief complaint cough  Rosi Singleton is a 54 y.o. female.     Cough  Associated symptoms include chest pain, headaches and rhinorrhea.  patient is here today complaining of a cough.  This began approximately 2 weeks ago.  The cough is described as uncontrollable.  She sounds like from her daughter that she gets into fits of coughing.  She is not having fever or chills but she is having some left-sided chest discomfort.  I did try to discern whether she is taking the valsartan.  It seems like we have not filled that since 2023.  It almost sounds as if she is taking it only when her blood pressure is high or when she has a headache.  She does have headaches usually in the morning.  She has had prior MRI scan showing no significant abnormalities.  I suspect this may be related to blood pressure.  We did discuss that she needs to be on a blood pressure medicine and take it every day.  We are going to prescribe a new blood pressure medicine form of atenolol which may also help with headaches.  Additionally she has had some impaired glucose tolerance and hyperlipidemia that she would like to get lab work done for today.  The following portions of the patient's history were reviewed and updated as appropriate: She  has a past medical history of Hypertension.  She does not have any pertinent problems on file.  Current Outpatient Medications   Medication Sig Dispense Refill    atenolol (TENORMIN) 25 MG tablet Take 1 tablet by mouth Daily. 30 tablet 5    azithromycin (Zithromax Z-Elton) 250 MG tablet Take 2 tablets by mouth on day 1, then 1 tablet daily on days 2-5 6 tablet 0    predniSONE (DELTASONE) 20 MG tablet Take 1 tablet by mouth 2 (Two) Times a Day. 10 tablet 0     No current facility-administered medications for this visit.     She has no known allergies..    Review of Systems   HENT:  Positive for rhinorrhea.    Respiratory:  Positive for cough.    Cardiovascular:  Positive for chest pain.    Neurological:  Positive for headaches.     Objective   Physical Exam  Vitals and nursing note reviewed.   Constitutional:       Appearance: Normal appearance.   HENT:      Nose: Congestion and rhinorrhea present.      Mouth/Throat:      Mouth: Mucous membranes are moist.      Pharynx: Oropharynx is clear. No oropharyngeal exudate or posterior oropharyngeal erythema.   Cardiovascular:      Rate and Rhythm: Normal rate and regular rhythm.      Heart sounds: No murmur heard.     No friction rub. No gallop.   Pulmonary:      Breath sounds: Wheezing and rhonchi present.      Comments: She has bilateral wheezes and rhonchi that are worse on the left than the right.  Neurological:      Mental Status: She is alert.     Assessment & Plan   Diagnoses and all orders for this visit:    1. Bronchitis (Primary)  -     XR Chest PA & Lateral; Future    2. Wheezing  -     XR Chest PA & Lateral; Future    3. Essential (primary) hypertension  -     CBC & Differential  -     Comprehensive Metabolic Panel  -     Lipid Panel    4. Impaired glucose tolerance  -     Comprehensive Metabolic Panel  -     Hemoglobin A1c  -     Lipid Panel    Other orders  -     predniSONE (DELTASONE) 20 MG tablet; Take 1 tablet by mouth 2 (Two) Times a Day.  Dispense: 10 tablet; Refill: 0  -     azithromycin (Zithromax Z-Elton) 250 MG tablet; Take 2 tablets by mouth on day 1, then 1 tablet daily on days 2-5  Dispense: 6 tablet; Refill: 0  -     atenolol (TENORMIN) 25 MG tablet; Take 1 tablet by mouth Daily.  Dispense: 30 tablet; Refill: 5    Patient is here today for cough.  She does have wheezing and rhonchi.  The rhonchi sound worse on the left-hand side where she is saying she is having a chest pain.  I therefore am going to get a chest x-ray.  I advised him to go to the outpatient registration at [facility].  I will call them with the results.  In the interim I am going to start her on prednisone for the wheezing and a Z-Elton for the bronchitis.  Not going to  give her a cough suppressant until I see what her chest x-ray looks like.  I did advise that we were starting a new medicine for blood pressure.  I did advise to take this every day and return to see me in 3 to 4 weeks

## 2025-04-26 LAB
BASOPHILS # BLD AUTO: 0.03 10*3/MM3 (ref 0–0.2)
BASOPHILS NFR BLD AUTO: 0.3 % (ref 0–1.5)
EOSINOPHIL # BLD AUTO: 0.32 10*3/MM3 (ref 0–0.4)
EOSINOPHIL NFR BLD AUTO: 3.4 % (ref 0.3–6.2)
ERYTHROCYTE [DISTWIDTH] IN BLOOD BY AUTOMATED COUNT: 12.8 % (ref 12.3–15.4)
HBA1C MFR BLD: 6.4 % (ref 4.8–5.6)
HCT VFR BLD AUTO: 38.1 % (ref 34–46.6)
HGB BLD-MCNC: 12.7 G/DL (ref 12–15.9)
IMM GRANULOCYTES # BLD AUTO: 0.03 10*3/MM3 (ref 0–0.05)
IMM GRANULOCYTES NFR BLD AUTO: 0.3 % (ref 0–0.5)
LYMPHOCYTES # BLD AUTO: 2.94 10*3/MM3 (ref 0.7–3.1)
LYMPHOCYTES NFR BLD AUTO: 30.8 % (ref 19.6–45.3)
MCH RBC QN AUTO: 28.5 PG (ref 26.6–33)
MCHC RBC AUTO-ENTMCNC: 33.3 G/DL (ref 31.5–35.7)
MCV RBC AUTO: 85.6 FL (ref 79–97)
MONOCYTES # BLD AUTO: 0.52 10*3/MM3 (ref 0.1–0.9)
MONOCYTES NFR BLD AUTO: 5.4 % (ref 5–12)
NEUTROPHILS # BLD AUTO: 5.71 10*3/MM3 (ref 1.7–7)
NEUTROPHILS NFR BLD AUTO: 59.8 % (ref 42.7–76)
NRBC BLD AUTO-RTO: 0 /100 WBC (ref 0–0.2)
PLATELET # BLD AUTO: 335 10*3/MM3 (ref 140–450)
RBC # BLD AUTO: 4.45 10*6/MM3 (ref 3.77–5.28)
WBC # BLD AUTO: 9.55 10*3/MM3 (ref 3.4–10.8)

## 2025-06-28 ENCOUNTER — HOSPITAL ENCOUNTER (EMERGENCY)
Facility: HOSPITAL | Age: 55
Discharge: HOME | End: 2025-06-28
Attending: EMERGENCY MEDICINE
Payer: COMMERCIAL

## 2025-06-28 VITALS
TEMPERATURE: 100.7 F | SYSTOLIC BLOOD PRESSURE: 148 MMHG | OXYGEN SATURATION: 94 % | RESPIRATION RATE: 18 BRPM | BODY MASS INDEX: 27.3 KG/M2 | WEIGHT: 154.1 LBS | HEART RATE: 94 BPM | HEIGHT: 63 IN | DIASTOLIC BLOOD PRESSURE: 97 MMHG

## 2025-06-28 DIAGNOSIS — J02.0 STREP PHARYNGITIS: Primary | ICD-10-CM

## 2025-06-28 LAB
B PARAPERT DNA SPEC QL NAA+PROBE: NOT DETECTED
B PERT DNA SPEC QL NAA+PROBE: NOT DETECTED
C PNEUM DNA NPH QL NAA+NON-PROBE: NOT DETECTED
FLUAV SUBTYP SPEC NAA+PROBE: NOT DETECTED
FLUBV RNA NPH QL NAA+NON-PROBE: NOT DETECTED
HADV DNA SPEC NAA+PROBE: NOT DETECTED
HCOV 229E RNA SPEC QL NAA+PROBE: NOT DETECTED
HCOV HKU1 RNA SPEC QL NAA+PROBE: NOT DETECTED
HCOV NL63 RNA SPEC QL NAA+PROBE: NOT DETECTED
HCOV OC43 RNA SPEC QL NAA+PROBE: NOT DETECTED
HMPV RNA NPH QL NAA+NON-PROBE: NOT DETECTED
HPIV1 RNA ISLT QL NAA+PROBE: NOT DETECTED
HPIV2 RNA SPEC QL NAA+PROBE: NOT DETECTED
HPIV3 RNA NPH QL NAA+PROBE: NOT DETECTED
HPIV4 P GENE NPH QL NAA+PROBE: NOT DETECTED
M PNEUMO IGG SER IA-ACNC: NOT DETECTED
RHINOVIRUS RNA SPEC NAA+PROBE: NOT DETECTED
RSV RNA NPH QL NAA+NON-PROBE: NOT DETECTED
S PYO AG THROAT QL: POSITIVE
SARS-COV-2 RNA NPH QL NAA+NON-PROBE: NOT DETECTED

## 2025-06-28 PROCEDURE — 0202U NFCT DS 22 TRGT SARS-COV-2: CPT | Performed by: EMERGENCY MEDICINE

## 2025-06-28 PROCEDURE — 25010000002 PENICILLIN G BENZATHINE PER 1200000 UNITS: Performed by: EMERGENCY MEDICINE

## 2025-06-28 PROCEDURE — 99283 EMERGENCY DEPT VISIT LOW MDM: CPT

## 2025-06-28 PROCEDURE — 87880 STREP A ASSAY W/OPTIC: CPT | Performed by: EMERGENCY MEDICINE

## 2025-06-28 PROCEDURE — 25010000002 KETOROLAC TROMETHAMINE PER 15 MG: Performed by: EMERGENCY MEDICINE

## 2025-06-28 PROCEDURE — 96372 THER/PROPH/DIAG INJ SC/IM: CPT

## 2025-06-28 RX ORDER — KETOROLAC TROMETHAMINE 30 MG/ML
30 INJECTION, SOLUTION INTRAMUSCULAR; INTRAVENOUS ONCE
Status: COMPLETED | OUTPATIENT
Start: 2025-06-28 | End: 2025-06-28

## 2025-06-28 RX ORDER — ACETAMINOPHEN 500 MG
1000 TABLET ORAL ONCE
Status: COMPLETED | OUTPATIENT
Start: 2025-06-28 | End: 2025-06-28

## 2025-06-28 RX ADMIN — PENICILLIN G BENZATHINE 1.2 MILLION UNITS: 1200000 INJECTION, SUSPENSION INTRAMUSCULAR at 13:29

## 2025-06-28 RX ADMIN — KETOROLAC TROMETHAMINE 30 MG: 30 INJECTION INTRAMUSCULAR; INTRAVENOUS at 13:27

## 2025-06-28 RX ADMIN — ACETAMINOPHEN 1000 MG: 500 TABLET, FILM COATED ORAL at 12:07

## 2025-06-28 NOTE — DISCHARGE INSTRUCTIONS
Tylenol and ibuprofen for pain control, stay well-hydrated, outpatient PCP follow-up as needed, ED return for worsening symptoms as needed.    You received an antibiotic shot in the ER, this will completely treat your strep throat and you do not need further antibiotics.

## 2025-06-28 NOTE — ED PROVIDER NOTES
EMERGENCY DEPARTMENT ENCOUNTER    Room Number:  10/10  PCP: Floyd Rodriguez MD  Historian: Patient      HPI:  Chief Complaint: Sore throat, headache  A complete HPI/ROS/PMH/PSH/SH/FH are unobtainable due to: None    Context: Rosi Singleton is a 54 y.o. female who presents to the ED via private vehicle c/o acute sore throat headache since yesterday.  Subjective fevers at home.  No reported cough, vomiting, diarrhea.      MEDICAL RECORD REVIEW    External (non-ED) record review: No medication allergies listed on chart review in epic              PAST MEDICAL HISTORY  Active Ambulatory Problems     Diagnosis Date Noted    Arthritis 10/14/2020    Essential (primary) hypertension 10/14/2020    Occipital headache 10/14/2020    Gastroesophageal reflux disease without esophagitis 10/14/2020    Dupuytren's contracture of both hands 10/14/2020    Hyperglycemia 10/14/2020    Elevated transaminase level 10/14/2020    Impaired glucose tolerance 12/17/2020    Encounter for screening for malignant neoplasm of colon 06/01/2022    Epigastric pain 06/01/2022    Nausea and vomiting 06/01/2022    Heartburn 06/01/2022     Resolved Ambulatory Problems     Diagnosis Date Noted    Sore throat 10/14/2020     Past Medical History:   Diagnosis Date    Hypertension          PAST SURGICAL HISTORY  Past Surgical History:   Procedure Laterality Date    COLONOSCOPY      COLONOSCOPY N/A 8/1/2023    Procedure: COLONOSCOPY into cecum and normal TI  with bx's and cold bx polypectomy;  Surgeon: Pablo Lema MD;  Location: [facility] ENDOSCOPY;  Service: Gastroenterology;  Laterality: N/A;  pre: diarrhea  post: polyp    ENDOSCOPY N/A 8/1/2023    Procedure: ESOPHAGOGASTRODUODENOSCOPY with bx;  Surgeon: Pablo Lema MD;  Location: [facility] ENDOSCOPY;  Service: Gastroenterology;  Laterality: N/A;  pre: ABD pain, heartburn  post: normal         FAMILY HISTORY  No family history on file.      SOCIAL HISTORY  Social History      Socioeconomic History    Marital status: Single   Tobacco Use    Smoking status: Never    Smokeless tobacco: Never   Vaping Use    Vaping status: Never Used   Substance and Sexual Activity    Alcohol use: No    Drug use: No    Sexual activity: Defer         ALLERGIES  Patient has no known allergies.        REVIEW OF SYSTEMS  Review of Systems     All systems reviewed and negative except for those discussed in HPI.       PHYSICAL EXAM    I have reviewed the triage vital signs and nursing notes.    ED Triage Vitals [06/28/25 1138]   Temp Heart Rate Resp BP SpO2   (!) 100.7 °F (38.2 °C) 116 18 (!) 134/114 92 %      Temp src Heart Rate Source Patient Position BP Location FiO2 (%)   Oral Monitor Sitting Right arm --       Physical Exam  General: No acute distress, nontoxic  HEENT: EOMI, erythematous oropharynx, uvula midline, no overtly evident abscess  Pulm: Symmetric chest rise, nonlabored breathing  CV: Regular rate and rhythm  GI: Nondistended  MSK: No deformity  Skin: Warm, dry  Neuro: Awake, alert, oriented x 4, moving all extremities, no focal deficits  Psych: Calm, cooperative    Vital signs and nursing notes reviewed.         LAB RESULTS  Recent Results (from the past 24 hours)   Respiratory Panel PCR w/COVID-19(SARS-CoV-2) GEE/LISA/NAYAN/PAD/COR/ANALISA In-House, NP Swab in UTM/VTM, 2 HR TAT - Swab, Nasopharynx    Collection Time: 06/28/25 11:58 AM    Specimen: Nasopharynx; Swab   Result Value Ref Range    ADENOVIRUS, PCR Not Detected Not Detected    Coronavirus 229E Not Detected Not Detected    Coronavirus HKU1 Not Detected Not Detected    Coronavirus NL63 Not Detected Not Detected    Coronavirus OC43 Not Detected Not Detected    COVID19 Not Detected Not Detected - Ref. Range    Human Metapneumovirus Not Detected Not Detected    Human Rhinovirus/Enterovirus Not Detected Not Detected    Influenza A PCR Not Detected Not Detected    Influenza B PCR Not Detected Not Detected    Parainfluenza Virus 1 Not Detected Not  Detected    Parainfluenza Virus 2 Not Detected Not Detected    Parainfluenza Virus 3 Not Detected Not Detected    Parainfluenza Virus 4 Not Detected Not Detected    RSV, PCR Not Detected Not Detected    Bordetella pertussis pcr Not Detected Not Detected    Bordetella parapertussis PCR Not Detected Not Detected    Chlamydophila pneumoniae PCR Not Detected Not Detected    Mycoplasma pneumo by PCR Not Detected Not Detected   Rapid Strep A Screen - Swab, Throat    Collection Time: 06/28/25 11:58 AM    Specimen: Throat; Swab   Result Value Ref Range    Strep A Ag Positive (A) Negative       Ordered the above labs and independently interpreted results. My findings will be discussed in the medical decision making section below        RADIOLOGY  No Radiology Exams Resulted Within Past 24 Hours    Ordered the above noted radiological studies.  Independently interpreted by me and my independent review of findings can be found in the ED Course.  See dictation for official radiology interpretation.      PROCEDURES    Procedures        MEDICATIONS GIVEN IN ER    Medications   acetaminophen (TYLENOL) tablet 1,000 mg (1,000 mg Oral Given 6/28/25 1207)   ketorolac (TORADOL) injection 30 mg (30 mg Intramuscular Given 6/28/25 1327)   Penicillin G Benzathine (BICILLIN-LA) injection 1.2 Million Units (1.2 Million Units Intramuscular Given 6/28/25 1329)         PROGRESS, DATA ANALYSIS, CONSULTS, AND MEDICAL DECISION MAKING    Please note that this section constitutes my independent interpretation of clinical data including lab results, radiology, EKG's.  This constitutes my independent professional opinion regarding differential diagnosis and management of this patient.  It may include any factors such as history from outside sources, review of external records, social determinants of health, management of medications, response to those treatments, and discussions with other providers.    Differential Diagnosis and Plan: Initial concern  for viral process, strep throat, among others.  Plan for viral swab, strep swab, and reevaluate.    Additional sources:  - Discussed/ obtained information from independent historians:       - (Social Determinants of Health): None     - Shared decision making:  Patient and family at bedside fully updated on and in agreement with the course and plan moving forward    ED Course as of 06/28/25 1337   Sat Jun 28, 2025   1244 Strep A Ag(!): Positive [DC]   1336 Given a one-time dose of IM penicillin G, dose of Toradol, with recommendations for outpatient NSAIDs as needed.  PCP follow-up as needed, ED return for worsening symptoms as needed. [DC]   1337 Patient is overall well-appearing in no acute distress without airway compromise on exam [DC]      ED Course User Index  [DC] Fabián Horner MD       Hospitalization Considered?:     Orders Placed During This Visit:  Orders Placed This Encounter   Procedures    Respiratory Panel PCR w/COVID-19(SARS-CoV-2) GEE/LISA/NAYAN/PAD/COR/ANALISA In-House, NP Swab in UTM/VTM, 2 HR TAT - Swab, Nasopharynx    Rapid Strep A Screen - Swab, Throat       Additional orders considered but not placed:      Independent interpretation of labs, radiology studies, and discussions with consultants: See ED Course        AS OF 13:37 EDT VITALS:    BP - 135/82  HR - 102  TEMP - (!) 100.7 °F (38.2 °C) (Oral)  02 SATS - 94%          DIAGNOSIS  Final diagnoses:   Strep pharyngitis         DISPOSITION  ED Disposition       ED Disposition   Discharge    Condition   Stable    Comment   --                Please note that portions of this document were completed with a voice recognition program.    Note Disclaimer: At [facility], we believe that sharing information builds trust and better relationships. You are receiving this note because you recently visited [facility]. It is possible you will see health information before a provider has talked with you about it. This kind of information can be easy to  misunderstand. To help you fully understand what it means for your health, we urge you to discuss this note with your provider.                       Fabián Horner MD  06/28/25 [id number]

## 2025-07-11 ENCOUNTER — OFFICE VISIT (OUTPATIENT)
Dept: FAMILY MEDICINE CLINIC | Facility: CLINIC | Age: 55
End: 2025-07-11
Payer: COMMERCIAL

## 2025-07-11 VITALS
OXYGEN SATURATION: 95 % | SYSTOLIC BLOOD PRESSURE: 156 MMHG | HEIGHT: 62 IN | BODY MASS INDEX: 32.39 KG/M2 | HEART RATE: 80 BPM | DIASTOLIC BLOOD PRESSURE: 108 MMHG | WEIGHT: 176 LBS

## 2025-07-11 DIAGNOSIS — J30.2 SEASONAL ALLERGIC RHINITIS, UNSPECIFIED TRIGGER: ICD-10-CM

## 2025-07-11 DIAGNOSIS — I10 ESSENTIAL (PRIMARY) HYPERTENSION: ICD-10-CM

## 2025-07-11 DIAGNOSIS — R05.2 SUBACUTE COUGH: ICD-10-CM

## 2025-07-11 DIAGNOSIS — J02.0 PHARYNGITIS DUE TO STREPTOCOCCUS SPECIES: Primary | ICD-10-CM

## 2025-07-11 PROCEDURE — 99214 OFFICE O/P EST MOD 30 MIN: CPT | Performed by: INTERNAL MEDICINE

## 2025-07-11 RX ORDER — LEVOCETIRIZINE DIHYDROCHLORIDE 5 MG/1
5 TABLET, FILM COATED ORAL EVERY EVENING
Qty: 30 TABLET | Refills: 5 | Status: SHIPPED | OUTPATIENT
Start: 2025-07-11

## 2025-07-11 NOTE — PROGRESS NOTES
Subjective complaint is follow-up after ER visit for sore throat  Rosi Singleton is a 54 y.o. female.     Sore Throat  Associated symptoms: congestion and cough   Bradley is here today for follow-up.  She was seen in the emergency room for a severe sore throat.  Her viral respiratory panel was negative.  Her strep test was positive for group A strep.  She was treated with a single dose of benzathine penicillin.  She is feeling better from that standpoint.  She still however is complaining of drainage and cough.  Her blood pressure today also is elevated.  Is very difficult to tell whether the patient is taking the atenolol or not.  I suspect she has not been taking it.  She has a habit of taking the medicine for a few days and then stopping it.  I did discuss with her son that she needs to take the atenolol every day.  I did advise to return in 3 weeks to have the blood pressure checked again.    The following portions of the patient's history were reviewed and updated as appropriate: She  has a past medical history of Hypertension.  She does not have any pertinent problems on file.  Current Outpatient Medications   Medication Sig Dispense Refill    atenolol (TENORMIN) 25 MG tablet Take 1 tablet by mouth Daily. 30 tablet 5    levocetirizine (XYZAL) 5 MG tablet Take 1 tablet by mouth Every Evening. 30 tablet 5     No current facility-administered medications for this visit.     She has no known allergies..    Review of Systems   HENT:  Positive for congestion and rhinorrhea. Negative for sore throat.    Respiratory:  Positive for cough.      Objective   Physical Exam  Vitals and nursing note reviewed.   Constitutional:       Appearance: Normal appearance.   HENT:      Right Ear: Tympanic membrane normal.      Left Ear: Tympanic membrane normal.      Ears:      Comments: She does have some tenderness to the right TMJ     Nose: Congestion and rhinorrhea present.      Mouth/Throat:      Pharynx: No  The patient has been examined and the H&P has been reviewed:    I concur with the findings and no changes have occurred since H&P was written.    Anesthesia/Surgery risks, benefits and alternative options discussed and understood by patient/family.          Active Hospital Problems    Diagnosis  POA    Chronic pain [G89.29]  Yes      Resolved Hospital Problems   No resolved problems to display.      oropharyngeal exudate or posterior oropharyngeal erythema.   Cardiovascular:      Rate and Rhythm: Normal rate.      Comments: Repeat blood pressure to my exam is 158/100  Lymphadenopathy:      Cervical: No cervical adenopathy.   Neurological:      Mental Status: She is alert.     Assessment & Plan   Diagnoses and all orders for this visit:    1. Pharyngitis due to Streptococcus species (Primary)    2. Seasonal allergic rhinitis, unspecified trigger    3. Subacute cough    4. Essential (primary) hypertension    Other orders  -     levocetirizine (XYZAL) 5 MG tablet; Take 1 tablet by mouth Every Evening.  Dispense: 30 tablet; Refill: 5    Bradley is here today for follow-up.  Her pharyngitis clinically and symptomatically seems to be resolved.  She does have considerable nasal congestion on exam.  Her lungs are clear.  I suspect that she is experiencing some allergy symptoms.  I am going to prescribe some Xyzal.  I did encourage him to fill her atenolol and take it routinely and see me back in 3 weeks

## 2025-08-08 ENCOUNTER — OFFICE VISIT (OUTPATIENT)
Dept: FAMILY MEDICINE CLINIC | Facility: CLINIC | Age: 55
End: 2025-08-08
Payer: COMMERCIAL

## 2025-08-08 VITALS
OXYGEN SATURATION: 96 % | DIASTOLIC BLOOD PRESSURE: 90 MMHG | WEIGHT: 172 LBS | RESPIRATION RATE: 16 BRPM | SYSTOLIC BLOOD PRESSURE: 124 MMHG | HEIGHT: 62 IN | HEART RATE: 83 BPM | BODY MASS INDEX: 31.65 KG/M2

## 2025-08-08 DIAGNOSIS — K21.9 LARYNGOPHARYNGEAL REFLUX (LPR): ICD-10-CM

## 2025-08-08 DIAGNOSIS — I10 ESSENTIAL (PRIMARY) HYPERTENSION: Primary | ICD-10-CM

## 2025-08-08 DIAGNOSIS — K21.9 GASTROESOPHAGEAL REFLUX DISEASE WITHOUT ESOPHAGITIS: ICD-10-CM

## 2025-08-08 DIAGNOSIS — R05.3 CHRONIC COUGH: ICD-10-CM

## 2025-08-08 PROCEDURE — 99214 OFFICE O/P EST MOD 30 MIN: CPT | Performed by: INTERNAL MEDICINE

## 2025-08-08 RX ORDER — PANTOPRAZOLE SODIUM 40 MG/1
40 TABLET, DELAYED RELEASE ORAL DAILY
Qty: 30 TABLET | Refills: 5 | Status: SHIPPED | OUTPATIENT
Start: 2025-08-08

## (undated) DEVICE — SENSR O2 OXIMAX FNGR A/ 18IN NONSTR

## (undated) DEVICE — CANN O2 ETCO2 FITS ALL CONN CO2 SMPL A/ 7IN DISP LF

## (undated) DEVICE — TUBING, SUCTION, 1/4" X 10', STRAIGHT: Brand: MEDLINE

## (undated) DEVICE — KT ORCA ORCAPOD DISP STRL

## (undated) DEVICE — LN SMPL CO2 SHTRM SD STREAM W/M LUER

## (undated) DEVICE — BITEBLOCK OMNI BLOC

## (undated) DEVICE — ADAPT CLN BIOGUARD AIR/H2O DISP

## (undated) DEVICE — FRCP BX RADJAW4 NDL 2.8 240CM LG OG BX40